# Patient Record
Sex: FEMALE | Race: WHITE | Employment: FULL TIME | URBAN - METROPOLITAN AREA
[De-identification: names, ages, dates, MRNs, and addresses within clinical notes are randomized per-mention and may not be internally consistent; named-entity substitution may affect disease eponyms.]

---

## 2017-09-09 PROCEDURE — 86706 HEP B SURFACE ANTIBODY: CPT | Performed by: INTERNAL MEDICINE

## 2017-09-09 PROCEDURE — 87340 HEPATITIS B SURFACE AG IA: CPT | Performed by: INTERNAL MEDICINE

## 2017-09-09 PROCEDURE — 82607 VITAMIN B-12: CPT | Performed by: INTERNAL MEDICINE

## 2017-09-15 ENCOUNTER — HOSPITAL ENCOUNTER (OUTPATIENT)
Age: 52
Discharge: HOME | End: 2017-09-15
Payer: COMMERCIAL

## 2017-09-15 ENCOUNTER — APPOINTMENT (OUTPATIENT)
Dept: GENERAL RADIOLOGY | Age: 52
End: 2017-09-15
Attending: PHYSICIAN ASSISTANT
Payer: COMMERCIAL

## 2017-09-15 VITALS
BODY MASS INDEX: 33.46 KG/M2 | WEIGHT: 196 LBS | HEART RATE: 76 BPM | TEMPERATURE: 99 F | OXYGEN SATURATION: 96 % | HEIGHT: 64 IN | RESPIRATION RATE: 18 BRPM | SYSTOLIC BLOOD PRESSURE: 146 MMHG | DIASTOLIC BLOOD PRESSURE: 80 MMHG

## 2017-09-15 DIAGNOSIS — M25.532 LEFT WRIST PAIN: Primary | ICD-10-CM

## 2017-09-15 DIAGNOSIS — M79.642 LEFT HAND PAIN: ICD-10-CM

## 2017-09-15 PROCEDURE — 73130 X-RAY EXAM OF HAND: CPT | Performed by: PHYSICIAN ASSISTANT

## 2017-09-15 PROCEDURE — 73110 X-RAY EXAM OF WRIST: CPT | Performed by: PHYSICIAN ASSISTANT

## 2017-09-15 PROCEDURE — 99213 OFFICE O/P EST LOW 20 MIN: CPT

## 2017-09-15 NOTE — ED PROVIDER NOTES
Patient Seen in: 5 [facility] [place]    History   Patient presents with:  Wrist Pain    Stated Complaint: Left Wrist Injury    HPI    Patient is a 27-year-old female who presents for evaluation of left wrist and hand pain ×1 day. Other systems are as noted in HPI. Constitutional and vital signs reviewed. All other systems reviewed and negative except as noted above. PSFH elements reviewed from today and agreed except as otherwise stated in HPI.     Physical Exam   ED Triage Clinical Impression:  Left wrist pain  (primary encounter diagnosis)  Left hand pain    Disposition:  Discharge    Follow-up:  Orestes Lee MD  [address]  [phone]    Schedule an appointment as soon as possible

## 2017-09-15 NOTE — ED NOTES
PATIENT REQUESTING TO SEE THE VELCRO SPLINT ON.  PATIENT STATES \"I JUST WANT TO SEE HOW IT FITS ON AND THEN I'LL BUY ONE AT [facility]"

## 2017-09-15 NOTE — ED INITIAL ASSESSMENT (HPI)
PATIENT ARRIVED AMBULATORY TO ROOM C/O LEFT WRIST PAIN STARTED LAST NIGHT. PATIENT STATES \"I WAS USING THE TV REMOTE YESTERDAY NIGHT AND IT WAS HARD TO WORK IT AND MY WRIST WAS REALLY HURTING\" NO ACUTE INJURY.  PATIENT STATES \"IT'S HARD FOR ME TO  TH

## 2017-09-27 PROCEDURE — 84075 ASSAY ALKALINE PHOSPHATASE: CPT | Performed by: INTERNAL MEDICINE

## 2017-09-27 PROCEDURE — 84080 ASSAY ALKALINE PHOSPHATASES: CPT | Performed by: INTERNAL MEDICINE

## 2017-12-21 PROCEDURE — 86663 EPSTEIN-BARR ANTIBODY: CPT | Performed by: INTERNAL MEDICINE

## 2017-12-21 PROCEDURE — 86665 EPSTEIN-BARR CAPSID VCA: CPT | Performed by: INTERNAL MEDICINE

## 2017-12-21 PROCEDURE — 86664 EPSTEIN-BARR NUCLEAR ANTIGEN: CPT | Performed by: INTERNAL MEDICINE

## 2017-12-21 PROCEDURE — 86618 LYME DISEASE ANTIBODY: CPT | Performed by: INTERNAL MEDICINE

## 2018-04-25 PROCEDURE — 82607 VITAMIN B-12: CPT | Performed by: INTERNAL MEDICINE

## 2018-08-21 ENCOUNTER — CHARTING TRANS (OUTPATIENT)
Dept: OTHER | Age: 53
End: 2018-08-21

## 2018-08-21 ENCOUNTER — HOSPITAL (OUTPATIENT)
Dept: OTHER | Age: 53
End: 2018-08-21
Attending: INTERNAL MEDICINE

## 2018-08-21 LAB
ANALYZER ANC (IANC): NORMAL
ANION GAP SERPL CALC-SCNC: 11 MMOL/L (ref 10–20)
BUN SERPL-MCNC: 14 MG/DL (ref 6–20)
BUN/CREAT SERPL: 22 (ref 7–25)
CALCIUM SERPL-MCNC: 9.1 MG/DL (ref 8.4–10.2)
CHLORIDE: 105 MMOL/L (ref 98–107)
CO2 SERPL-SCNC: 26 MMOL/L (ref 21–32)
CREAT SERPL-MCNC: 0.64 MG/DL (ref 0.51–0.95)
ERYTHROCYTE [DISTWIDTH] IN BLOOD: 13.4 % (ref 11–15)
GLUCOSE SERPL-MCNC: 116 MG/DL (ref 65–99)
HEMATOCRIT: 43.9 % (ref 36–46.5)
HGB BLD-MCNC: 14.9 GM/DL (ref 12–15.5)
MCH RBC QN AUTO: 30 PG (ref 26–34)
MCHC RBC AUTO-ENTMCNC: 33.9 GM/DL (ref 32–36.5)
MCV RBC AUTO: 88.3 FL (ref 78–100)
NRBC (NRBCRE): NORMAL
PLATELET # BLD: 251 THOUSAND/MCL (ref 140–450)
POTASSIUM SERPL-SCNC: 4.2 MMOL/L (ref 3.4–5.1)
RBC # BLD: 4.97 MILLION/MCL (ref 4–5.2)
SODIUM SERPL-SCNC: 138 MMOL/L (ref 135–145)
WBC # BLD: 9.2 THOUSAND/MCL (ref 4.2–11)

## 2019-06-01 PROCEDURE — 88305 TISSUE EXAM BY PATHOLOGIST: CPT | Performed by: INTERNAL MEDICINE

## 2019-07-18 PROCEDURE — 88175 CYTOPATH C/V AUTO FLUID REDO: CPT | Performed by: INTERNAL MEDICINE

## 2019-10-22 DIAGNOSIS — M25.571 PAIN IN RIGHT ANKLE AND JOINTS OF RIGHT FOOT: Primary | ICD-10-CM

## 2019-10-22 DIAGNOSIS — R26.2 DIFFICULTY WALKING INVOLVING FOOT: ICD-10-CM

## 2019-10-22 DIAGNOSIS — R60.0 EDEMA, LOWER EXTREMITY: ICD-10-CM

## 2019-10-23 ENCOUNTER — LAB ENCOUNTER (OUTPATIENT)
Dept: LAB | Facility: HOSPITAL | Age: 54
End: 2019-10-23
Attending: PODIATRIST
Payer: COMMERCIAL

## 2019-10-23 DIAGNOSIS — R60.0 EDEMA, LOWER EXTREMITY: ICD-10-CM

## 2019-10-23 DIAGNOSIS — R26.2 DIFFICULTY WALKING INVOLVING FOOT: ICD-10-CM

## 2019-10-23 DIAGNOSIS — M25.571 PAIN IN RIGHT ANKLE AND JOINTS OF RIGHT FOOT: ICD-10-CM

## 2019-10-23 PROCEDURE — 80048 BASIC METABOLIC PNL TOTAL CA: CPT

## 2019-10-23 PROCEDURE — 86431 RHEUMATOID FACTOR QUANT: CPT

## 2019-10-23 PROCEDURE — 85025 COMPLETE CBC W/AUTO DIFF WBC: CPT

## 2019-10-23 PROCEDURE — 36415 COLL VENOUS BLD VENIPUNCTURE: CPT

## 2019-10-23 PROCEDURE — 86038 ANTINUCLEAR ANTIBODIES: CPT

## 2019-11-18 ENCOUNTER — OFFICE VISIT (OUTPATIENT)
Dept: RHEUMATOLOGY | Facility: CLINIC | Age: 54
End: 2019-11-18
Payer: COMMERCIAL

## 2019-11-18 ENCOUNTER — APPOINTMENT (OUTPATIENT)
Dept: LAB | Age: 54
End: 2019-11-18
Attending: INTERNAL MEDICINE
Payer: COMMERCIAL

## 2019-11-18 VITALS
BODY MASS INDEX: 38.41 KG/M2 | WEIGHT: 225 LBS | SYSTOLIC BLOOD PRESSURE: 132 MMHG | HEART RATE: 69 BPM | HEIGHT: 64 IN | DIASTOLIC BLOOD PRESSURE: 78 MMHG

## 2019-11-18 DIAGNOSIS — M05.80 POLYARTHRITIS WITH POSITIVE RHEUMATOID FACTOR (HCC): ICD-10-CM

## 2019-11-18 DIAGNOSIS — M25.50 POLYARTHRALGIA: Primary | ICD-10-CM

## 2019-11-18 DIAGNOSIS — M25.50 POLYARTHRALGIA: ICD-10-CM

## 2019-11-18 PROCEDURE — 86235 NUCLEAR ANTIGEN ANTIBODY: CPT

## 2019-11-18 PROCEDURE — 82955 ASSAY OF G6PD ENZYME: CPT | Performed by: INTERNAL MEDICINE

## 2019-11-18 PROCEDURE — 86200 CCP ANTIBODY: CPT | Performed by: INTERNAL MEDICINE

## 2019-11-18 PROCEDURE — 86160 COMPLEMENT ANTIGEN: CPT | Performed by: INTERNAL MEDICINE

## 2019-11-18 PROCEDURE — 86140 C-REACTIVE PROTEIN: CPT | Performed by: INTERNAL MEDICINE

## 2019-11-18 PROCEDURE — 87340 HEPATITIS B SURFACE AG IA: CPT | Performed by: INTERNAL MEDICINE

## 2019-11-18 PROCEDURE — 86706 HEP B SURFACE ANTIBODY: CPT | Performed by: INTERNAL MEDICINE

## 2019-11-18 PROCEDURE — 86480 TB TEST CELL IMMUN MEASURE: CPT

## 2019-11-18 PROCEDURE — 36415 COLL VENOUS BLD VENIPUNCTURE: CPT | Performed by: INTERNAL MEDICINE

## 2019-11-18 PROCEDURE — 86225 DNA ANTIBODY NATIVE: CPT

## 2019-11-18 PROCEDURE — 86803 HEPATITIS C AB TEST: CPT | Performed by: INTERNAL MEDICINE

## 2019-11-18 PROCEDURE — 99244 OFF/OP CNSLTJ NEW/EST MOD 40: CPT | Performed by: INTERNAL MEDICINE

## 2019-11-18 PROCEDURE — 85652 RBC SED RATE AUTOMATED: CPT | Performed by: INTERNAL MEDICINE

## 2019-11-18 PROCEDURE — 86704 HEP B CORE ANTIBODY TOTAL: CPT | Performed by: INTERNAL MEDICINE

## 2019-11-18 NOTE — PROGRESS NOTES
Joe Schulz is a 49year old female who presents for Patient presents with:  Consult: High CLAUDIA  Joint Pain  Swelling: BL ankles  Fatigue  .    HPI:   CC: joint pain and swelling  Consult: referred by PCP Dr. Ellen Echavarria    This is a  50 yo F with hx of H Multiple Vitamins-Minerals (BIOTIN PLUS/CALCIUM/VIT D3) Oral Tab Take by mouth. • Vitamin B-12 1000 MCG Oral Tab Take 1,000 mcg by mouth daily.      • Cholestyramine 4 GM/DOSE Oral Powder      • Rosuvastatin Calcium (CRESTOR) 20 MG Oral Tab Take 1 table mouth, no Raynaud's, no nasal ulcers, no parotid swelling, no neck pain, no jaw pain, no temple pain  Eyes: No visual changes,   CVS: No chest pain, no heart disease  RS: No SOB, no Cough, No Pleurtic pain,   GI: No nausea, no vomiiting, no abominal pain, 13.5    Hematocrit      35.0 - 48.0 % 40.3    MCV      80.0 - 100.0 fL 86.3    MCH      26.0 - 34.0 pg 28.9    MCHC      31.0 - 37.0 g/dL 33.5    RDW-SD      35.1 - 46.3 fL 41.0    RDW      11.0 - 15.0 % 13.2    Platelet Count      438.3 - 450.0 10(3)uL 29 taken today.  No fractures, dislocation, or degenerative changes.      ASSESSMENT AND PLAN:     This is a  50 yo F with hx of HLD, CAD s/p Bypass, Basal cell carcinoma (last one 2018), Depression presents with joint pain and swelling and +RF.   Pain and swe

## 2019-11-18 NOTE — PATIENT INSTRUCTIONS
You were seen for joint pain and +RF  Now lets get some more blood work  Will have you f/u in 2 weeks

## 2019-11-21 ENCOUNTER — PATIENT MESSAGE (OUTPATIENT)
Dept: RHEUMATOLOGY | Facility: CLINIC | Age: 54
End: 2019-11-21

## 2019-11-22 ENCOUNTER — HOSPITAL ENCOUNTER (OUTPATIENT)
Dept: MRI IMAGING | Facility: HOSPITAL | Age: 54
Discharge: HOME | End: 2019-11-22
Attending: ORTHOPAEDIC SURGERY
Payer: COMMERCIAL

## 2019-11-22 DIAGNOSIS — M23.91 ACUTE INTERNAL DERANGEMENT OF RIGHT KNEE: ICD-10-CM

## 2019-11-22 PROCEDURE — 73721 MRI JNT OF LWR EXTRE W/O DYE: CPT | Performed by: ORTHOPAEDIC SURGERY

## 2019-11-22 NOTE — TELEPHONE ENCOUNTER
From: Shakira Rose  To: Marbin Petit MD  Sent: 11/21/2019 3:07 PM CST  Subject: Test Results Question    I got your message but I was just curious on the C-Reactive Protein. You said it was normal but my results were . 66 and the normal is under . 35

## 2019-12-12 ENCOUNTER — OFFICE VISIT (OUTPATIENT)
Dept: RHEUMATOLOGY | Facility: CLINIC | Age: 54
End: 2019-12-12
Payer: COMMERCIAL

## 2019-12-12 VITALS
RESPIRATION RATE: 16 BRPM | SYSTOLIC BLOOD PRESSURE: 112 MMHG | HEART RATE: 75 BPM | WEIGHT: 232 LBS | OXYGEN SATURATION: 96 % | DIASTOLIC BLOOD PRESSURE: 76 MMHG | HEIGHT: 64 IN | BODY MASS INDEX: 39.61 KG/M2

## 2019-12-12 DIAGNOSIS — M25.50 POLYARTHRALGIA: ICD-10-CM

## 2019-12-12 DIAGNOSIS — M05.79 RHEUMATOID ARTHRITIS INVOLVING MULTIPLE SITES WITH POSITIVE RHEUMATOID FACTOR (HCC): Primary | ICD-10-CM

## 2019-12-12 PROCEDURE — 99214 OFFICE O/P EST MOD 30 MIN: CPT | Performed by: INTERNAL MEDICINE

## 2019-12-12 RX ORDER — METHOTREXATE 2.5 MG/1
15 TABLET ORAL WEEKLY
Qty: 48 TABLET | Refills: 0 | Status: SHIPPED | OUTPATIENT
Start: 2019-12-12 | End: 2020-02-05

## 2019-12-12 RX ORDER — MELOXICAM 7.5 MG/1
7.5 TABLET ORAL 2 TIMES DAILY
Qty: 60 TABLET | Refills: 0 | Status: SHIPPED | OUTPATIENT
Start: 2019-12-12 | End: 2020-07-28

## 2019-12-12 RX ORDER — FOLIC ACID 1 MG/1
1 TABLET ORAL DAILY
Qty: 90 TABLET | Refills: 0 | Status: SHIPPED | OUTPATIENT
Start: 2019-12-12 | End: 2020-02-05

## 2019-12-12 NOTE — PROGRESS NOTES
Keysha Villa is a 49year old female. HPI:   Patient presents with:   Follow - Up: Polyarthralgia, pt c/o stiff neck and pain of knees, ankles and back  Lab Results      I had the pleasure of seeing Keysha Villa on 12/12/2019 for follow u 75 MG Oral Capsule SR 24 Hr Once daily 90 capsule 3   • Zolpidem Tartrate 5 MG Oral Tab TAKE 1 TABLET BY MOUTH AT BEDTIME AS NEEDED FOR SLEEP 30 tablet 5   • Cholecalciferol (VITAMIN D3) 2000 units Oral Tab Take by mouth.      • Multiple Vitamins-Minerals ( x10(3) uL  7.8   RBC      3.80 - 5.30 x10(6)uL  4.67   Hemoglobin      12.0 - 16.0 g/dL  13.5   Hematocrit      35.0 - 48.0 %  40.3   MCV      80.0 - 100.0 fL  86.3   MCH      26.0 - 34.0 pg  28.9   MCHC      31.0 - 37.0 g/dL  33.5   RDW-SD      35.1 - 46. Negative    Anti-Sjogren's B      Negative Negative      Component      Latest Ref Rng & Units 11/18/2019   Quantiferon TB NIL      IU/mL 0.03   Quantiferon-TB1 Minus NIL      IU/mL -0.01   Quantiferon-TB2 Minus NIL      IU/mL 0.00   Quantiferon TB Mitogen

## 2020-02-01 ENCOUNTER — OFFICE VISIT (OUTPATIENT)
Dept: RHEUMATOLOGY | Facility: CLINIC | Age: 55
End: 2020-02-01
Payer: COMMERCIAL

## 2020-02-01 ENCOUNTER — TELEPHONE (OUTPATIENT)
Dept: RHEUMATOLOGY | Facility: CLINIC | Age: 55
End: 2020-02-01

## 2020-02-01 VITALS
SYSTOLIC BLOOD PRESSURE: 120 MMHG | HEART RATE: 76 BPM | WEIGHT: 226.5 LBS | DIASTOLIC BLOOD PRESSURE: 80 MMHG | BODY MASS INDEX: 38.67 KG/M2 | HEIGHT: 64 IN

## 2020-02-01 DIAGNOSIS — M25.50 POLYARTHRALGIA: ICD-10-CM

## 2020-02-01 DIAGNOSIS — Z51.81 MEDICATION MONITORING ENCOUNTER: ICD-10-CM

## 2020-02-01 DIAGNOSIS — M05.79 RHEUMATOID ARTHRITIS INVOLVING MULTIPLE SITES WITH POSITIVE RHEUMATOID FACTOR (HCC): Primary | ICD-10-CM

## 2020-02-01 PROCEDURE — 99214 OFFICE O/P EST MOD 30 MIN: CPT | Performed by: INTERNAL MEDICINE

## 2020-02-01 NOTE — PATIENT INSTRUCTIONS
You were seen today for RA  Cont MTX 6 pills weekly  Plan to get you approved for [employer]  F/u in 6 weeks

## 2020-02-01 NOTE — PROGRESS NOTES
Mariah Donald is a 49year old female. HPI:   Patient presents with:  Rheumatoid Arthritis: pt reports symptoms remain the same  Back Pain      I had the pleasure of seeing Mariah Donald on 2/1/2020 for follow up newly dx Seropositive RA. Outpatient Medications   Medication Sig Dispense Refill   • Methotrexate Sodium 2.5 MG Oral Tab Take 6 tablets (15 mg total) by mouth once a week. 48 tablet 0   • folic acid 1 MG Oral Tab Take 1 tablet (1 mg total) by mouth daily.  90 tablet 0   • Venlafaxi Ankles: b/l swelling, L worse than R  Feet: no pain with MTP squeeze, no toe swelling or pain or warmth on palpation with FROM  Spine: no lumbar or sacral pain on palpation. NEURO: Cranial nerves II-XII intact grossly.  5/5 strength throughout in both up [id number]   HEPATITIS B CORE AB, TOTAL      Nonreactive  Nonreactive   GLUCOSE-6-PHOSPHATE DEHYDROGEN      9.9 - 16.6 U/g Hb 10.8   HCV AB      Nonreactive  Nonreactive     Imaging:     MRI R knee 11/2019:  CONCLUSION:   1. Mild chondromalacia patella.   2.

## 2020-02-05 ENCOUNTER — TELEPHONE (OUTPATIENT)
Dept: RHEUMATOLOGY | Facility: CLINIC | Age: 55
End: 2020-02-05

## 2020-02-05 NOTE — TELEPHONE ENCOUNTER
Patient is calling because she has one pill left of Methotrexate Sodium 2.5 MG Oral Tab, she was informed by Dr. Kati Ge that she would be leaning patient off this medication and starting a new one.  Patient wants to know if she will be on Methotrexate Sodium

## 2020-02-05 NOTE — TELEPHONE ENCOUNTER
Please see below. Pt was approved for Xenia Barker through 5/3/2020. Should pt discontinue MTX at this time? Or continue MTX with Xenia Barker and discuss tapering off during f/u appt on 4/4? Please advise.

## 2020-02-05 NOTE — TELEPHONE ENCOUNTER
Pt advised to stop methotrexate per provider's message below. She is aware to contact [facility] phone number provided by [facility] and schedule delivery of medication. Pt verbalized understanding and agreeable with plan.

## 2020-02-13 NOTE — TELEPHONE ENCOUNTER
Spoke with pt, she has received her medication. Pt is aware to complete monitoring labs prior to f/u appt.      Future Appointments   Date Time Provider Norma Morley   4/4/2020  9:00 AM Jair Isabel MD 2014 [facility]

## 2020-02-19 NOTE — TELEPHONE ENCOUNTER
Active 2/1/2020 Gonzalez Horan 4/29/1965 Michael Porter XR Approved The patient must fill with [facility]. We have left a message for the patient to notify her and transferred the prescription and copay card on your behalf. None.  [facility] 0.00 DISPLAY PLAN FREE TEXT

## 2020-03-27 ENCOUNTER — PATIENT MESSAGE (OUTPATIENT)
Dept: RHEUMATOLOGY | Facility: CLINIC | Age: 55
End: 2020-03-27

## 2020-04-02 NOTE — TELEPHONE ENCOUNTER
Pt returned call and is agreeable to rescheduling appt. She currently feels well. Denies any side effects to new medication. She completed monitoring labs on 3/27 - labs were reviewed by provider. Appt rescheduled to:      Future Appointments   Date Time Pr

## 2020-05-06 ENCOUNTER — VIRTUAL PHONE E/M (OUTPATIENT)
Dept: RHEUMATOLOGY | Facility: CLINIC | Age: 55
End: 2020-05-06
Payer: COMMERCIAL

## 2020-05-06 DIAGNOSIS — Z51.81 MEDICATION MONITORING ENCOUNTER: ICD-10-CM

## 2020-05-06 DIAGNOSIS — M05.79 RHEUMATOID ARTHRITIS INVOLVING MULTIPLE SITES WITH POSITIVE RHEUMATOID FACTOR (HCC): Primary | ICD-10-CM

## 2020-05-06 PROCEDURE — 99213 OFFICE O/P EST LOW 20 MIN: CPT | Performed by: INTERNAL MEDICINE

## 2020-05-06 NOTE — PROGRESS NOTES
Sissy Pena is a 48year old female. HPI:   No chief complaint on file. Virtual Telephone Check-In    Sissy Pena verbally consents to a Virtual/Telephone Check-In visit on 05/06/20.     Patient understands and accepts financial re her ears also resolved      HISTORY:  Past Medical History:   Diagnosis Date   • ASTHMA    • Atherosclerosis of coronary artery    • Cancer [facility])     Basal Cell Carcinoma   • HEART DISEASE 11/2006    2v CABG   • Other and unspecified hyperlipidemia       S Absolute      0.90 - 4.00 10ˆ3/µL 2.36   Monocytes Absolute      0.10 - 1.00 10ˆ3/µL 0.65   Eosinophils Absolute      0.00 - 0.30 10ˆ3/µL 0.12   Basophils Absolute      0.00 - 0.10 10ˆ3/µL 0.05   nRBC Absolute      0.000 - 0.012 10ˆ3/µL 0.000   Neutrophils

## 2020-05-06 NOTE — PATIENT INSTRUCTIONS
We discussed her symptoms of rheumatoid arthritis  Continue Xeljanz 11 mg daily  Please get blood work in July  Follow-up after blood work

## 2020-05-20 ENCOUNTER — TELEPHONE (OUTPATIENT)
Dept: RHEUMATOLOGY | Facility: CLINIC | Age: 55
End: 2020-05-20

## 2020-05-20 NOTE — TELEPHONE ENCOUNTER
Completed via Cover My Meds: This request has been approved. CaseId:[id number];Status:Approved; Review Type:Prior Auth; Coverage Start Date:04/20/2020; Coverage End Date:05/20/2023;

## 2020-07-24 ENCOUNTER — OFFICE VISIT (OUTPATIENT)
Dept: RHEUMATOLOGY | Facility: CLINIC | Age: 55
End: 2020-07-24
Payer: COMMERCIAL

## 2020-07-24 VITALS
SYSTOLIC BLOOD PRESSURE: 120 MMHG | HEART RATE: 71 BPM | HEIGHT: 64 IN | BODY MASS INDEX: 38.24 KG/M2 | DIASTOLIC BLOOD PRESSURE: 80 MMHG | WEIGHT: 224 LBS

## 2020-07-24 DIAGNOSIS — M05.79 RHEUMATOID ARTHRITIS INVOLVING MULTIPLE SITES WITH POSITIVE RHEUMATOID FACTOR (HCC): Primary | ICD-10-CM

## 2020-07-24 DIAGNOSIS — Z51.81 MEDICATION MONITORING ENCOUNTER: ICD-10-CM

## 2020-07-24 PROCEDURE — 3074F SYST BP LT 130 MM HG: CPT | Performed by: INTERNAL MEDICINE

## 2020-07-24 PROCEDURE — 3079F DIAST BP 80-89 MM HG: CPT | Performed by: INTERNAL MEDICINE

## 2020-07-24 PROCEDURE — 3008F BODY MASS INDEX DOCD: CPT | Performed by: INTERNAL MEDICINE

## 2020-07-24 PROCEDURE — 99214 OFFICE O/P EST MOD 30 MIN: CPT | Performed by: INTERNAL MEDICINE

## 2020-07-24 NOTE — PROGRESS NOTES
Rahul Fonseca is a 48year old female. HPI:   Patient presents with:  Medication Follow-Up: Shayy Don  Test Results  Knee Pain    Joyce Arana presents today 7/24/2020 for newly dx Seropositive RA.      Current Medications:  Xeljanz 11 mg daily- started artery    • Cancer [facility])     Basal Cell Carcinoma   • HEART DISEASE 11/2006    2v CABG   • Other and unspecified hyperlipidemia       Social Hx Reviewed   Family Hx Reviewed     Medications (Active prior to today's visit):  Current Outpatient Medications palpation  Feet: no pain with MTP squeeze, no toe swelling or pain or warmth on palpation with FROM  Spine: no lumbar or sacral pain on palpation. NEURO: Cranial nerves II-XII intact grossly.  5/5 strength throughout in both upper and lower extremities, se TOTAL      Nonreactive  Nonreactive   GLUCOSE-6-PHOSPHATE DEHYDROGEN      9.9 - 16.6 U/g Hb 10.8   HCV AB      Nonreactive  Nonreactive     Imaging:     MRI R knee 11/2019:  CONCLUSION:   1. Mild chondromalacia patella.   2. Otherwise no acute internal righ

## 2020-08-19 RX ORDER — TOFACITINIB 11 MG/1
11 TABLET, FILM COATED, EXTENDED RELEASE ORAL DAILY
Qty: 30 TABLET | Refills: 5 | Status: SHIPPED | OUTPATIENT
Start: 2020-08-19 | End: 2021-01-20

## 2020-08-19 NOTE — TELEPHONE ENCOUNTER
Patient is requesting a refill to [facility] [address] [place] [address]. Patient has about 5-6 pills left. They are requesting a verbal script. Patient is requesting a call as soon as medication is called in. [facility] Ph # [phone].     XELJANZ XR 11 MG Oral Tablet 24 Hr

## 2020-08-19 NOTE — TELEPHONE ENCOUNTER
LOV: 7/24/2020  Future Appointments   Date Time Provider Norma Mejíaisti   11/7/2020  8:40 AM Marciano Reese MD 2014 [facility]     Labs:   Labs scanned to media from 7/3/2020:   - CBC WNL  - Cr WNL  - ALT elevated at 52 IU/L

## 2020-10-28 ENCOUNTER — TELEPHONE (OUTPATIENT)
Dept: RHEUMATOLOGY | Facility: CLINIC | Age: 55
End: 2020-10-28

## 2020-10-28 NOTE — TELEPHONE ENCOUNTER
Name and  verified. Pt given provider's message below and verbalized understanding. She has a follow up appt on . FYI    Component      Latest Ref Rng & Units 10/26/2020   Patient Fasting?        Yes   Glucose      74 - 109 mg/dL 112 (H)   BUN

## 2020-11-07 ENCOUNTER — OFFICE VISIT (OUTPATIENT)
Dept: RHEUMATOLOGY | Facility: CLINIC | Age: 55
End: 2020-11-07
Payer: COMMERCIAL

## 2020-11-07 ENCOUNTER — LAB ENCOUNTER (OUTPATIENT)
Dept: LAB | Facility: HOSPITAL | Age: 55
End: 2020-11-07
Attending: INTERNAL MEDICINE
Payer: COMMERCIAL

## 2020-11-07 VITALS
BODY MASS INDEX: 36.7 KG/M2 | HEIGHT: 64 IN | HEART RATE: 76 BPM | WEIGHT: 215 LBS | DIASTOLIC BLOOD PRESSURE: 77 MMHG | SYSTOLIC BLOOD PRESSURE: 112 MMHG

## 2020-11-07 DIAGNOSIS — Z51.81 MEDICATION MONITORING ENCOUNTER: ICD-10-CM

## 2020-11-07 DIAGNOSIS — M05.79 RHEUMATOID ARTHRITIS INVOLVING MULTIPLE SITES WITH POSITIVE RHEUMATOID FACTOR (HCC): ICD-10-CM

## 2020-11-07 DIAGNOSIS — M05.79 RHEUMATOID ARTHRITIS INVOLVING MULTIPLE SITES WITH POSITIVE RHEUMATOID FACTOR (HCC): Primary | ICD-10-CM

## 2020-11-07 PROCEDURE — 3074F SYST BP LT 130 MM HG: CPT | Performed by: INTERNAL MEDICINE

## 2020-11-07 PROCEDURE — 3078F DIAST BP <80 MM HG: CPT | Performed by: INTERNAL MEDICINE

## 2020-11-07 PROCEDURE — 36415 COLL VENOUS BLD VENIPUNCTURE: CPT

## 2020-11-07 PROCEDURE — 3008F BODY MASS INDEX DOCD: CPT | Performed by: INTERNAL MEDICINE

## 2020-11-07 PROCEDURE — 99072 ADDL SUPL MATRL&STAF TM PHE: CPT | Performed by: INTERNAL MEDICINE

## 2020-11-07 PROCEDURE — 99214 OFFICE O/P EST MOD 30 MIN: CPT | Performed by: INTERNAL MEDICINE

## 2020-11-07 PROCEDURE — 85025 COMPLETE CBC W/AUTO DIFF WBC: CPT

## 2020-11-07 NOTE — PATIENT INSTRUCTIONS
You were seen today for rheumatoid arthritis  Please get one blood work done today it is the [address] daily  Please get blood work in 3 months again  Follow-up in 3 months

## 2020-11-07 NOTE — PROGRESS NOTES
Kosta Herrera is a 48year old female. HPI:   Patient presents with: Follow - Up    Nikole Yo presents today 11/7/2020 for newly dx Seropositive RA.      Current Medications:  Xeljanz 11 mg daily- started Feb 2020  Previous medications:  MTX 6 pill Medications (Active prior to today's visit):  Current Outpatient Medications   Medication Sig Dispense Refill   • Venlafaxine HCl ER (EFFEXOR XR) 150 MG Oral Capsule SR 24 Hr Take 1 capsule (150 mg total) by mouth daily.  90 capsule 3   • zolpidem 5 MG no lateral hip pain, MARCO A test negative b/l  Knees: FROM, no warmth or effusion present. No pain with ROM.    Ankles: FROM, no pain or swelling or warmth on palpation  Feet: no pain with MTP squeeze, no toe swelling or pain or warmth on palpation with FROM

## 2021-01-20 ENCOUNTER — PATIENT MESSAGE (OUTPATIENT)
Dept: RHEUMATOLOGY | Facility: CLINIC | Age: 56
End: 2021-01-20

## 2021-01-20 RX ORDER — TOFACITINIB 11 MG/1
11 TABLET, FILM COATED, EXTENDED RELEASE ORAL DAILY
Qty: 30 TABLET | Refills: 5 | Status: SHIPPED | OUTPATIENT
Start: 2021-01-20 | End: 2021-02-22

## 2021-01-20 NOTE — TELEPHONE ENCOUNTER
LOV: 11/7/2020  Future Appointments   Date Time Provider Norma Morley   3/6/2021  8:40 AM Jair Isabel MD 2014 [facility]   8/10/2021  5:00 PM Michi Camara PA-C [facility]   8/19/2021  4:40 PM Miroslava Cr MD DG [facility]     Labs: % 0.2

## 2021-02-22 RX ORDER — TOFACITINIB 11 MG/1
11 TABLET, FILM COATED, EXTENDED RELEASE ORAL DAILY
Qty: 30 TABLET | Refills: 5 | Status: SHIPPED | OUTPATIENT
Start: 2021-02-22 | End: 2021-10-19

## 2021-02-22 NOTE — TELEPHONE ENCOUNTER
Requested Prescriptions     Pending Prescriptions Disp Refills   • XELJANZ XR 11 MG Oral Tablet 24 Hr 30 tablet 5     Sig: Take 11 mg by mouth daily.      LF: 1/20/21 #30 tab w/ 5 rf  LOV: 11/7/20   Future Appointments   Date Time Provider Norma Morley 0.00 - 1.20 mg/dL 0.32   ALKALINE PHOSPHATASE      41 - 108 U/L 151 (H)   AST (SGOT)      0 - 32 U/L 52 (H)   ALT (SGPT)      0 - 33 U/L 82 (H)   GFR CKD-EPI      >=60.00 mL/min/1.73 m² 96.87     Summary:     You were seen today for rheumatoid arthritis  P

## 2021-03-06 ENCOUNTER — OFFICE VISIT (OUTPATIENT)
Dept: RHEUMATOLOGY | Facility: CLINIC | Age: 56
End: 2021-03-06
Payer: COMMERCIAL

## 2021-03-06 VITALS
WEIGHT: 207.13 LBS | DIASTOLIC BLOOD PRESSURE: 84 MMHG | SYSTOLIC BLOOD PRESSURE: 121 MMHG | HEART RATE: 79 BPM | HEIGHT: 64 IN | BODY MASS INDEX: 35.36 KG/M2

## 2021-03-06 DIAGNOSIS — M05.79 RHEUMATOID ARTHRITIS INVOLVING MULTIPLE SITES WITH POSITIVE RHEUMATOID FACTOR (HCC): Primary | ICD-10-CM

## 2021-03-06 DIAGNOSIS — Z51.81 MEDICATION MONITORING ENCOUNTER: ICD-10-CM

## 2021-03-06 PROCEDURE — 3079F DIAST BP 80-89 MM HG: CPT | Performed by: INTERNAL MEDICINE

## 2021-03-06 PROCEDURE — 3074F SYST BP LT 130 MM HG: CPT | Performed by: INTERNAL MEDICINE

## 2021-03-06 PROCEDURE — 99214 OFFICE O/P EST MOD 30 MIN: CPT | Performed by: INTERNAL MEDICINE

## 2021-03-06 PROCEDURE — 3008F BODY MASS INDEX DOCD: CPT | Performed by: INTERNAL MEDICINE

## 2021-03-06 NOTE — PROGRESS NOTES
Anibal Parker is a 48year old female. HPI:   Patient presents with:  Rheumatoid Arthritis: rt knee pain with activity     Felix Serrato presents today 3/6/2021 for newly dx Seropositive RA.      Current Medications:  Xeljanz 11 mg daily- started Feb 20 Social Hx Reviewed   Family Hx Reviewed     Medications (Active prior to today's visit):  Current Outpatient Medications   Medication Sig Dispense Refill   • XELJANZ XR 11 MG Oral Tablet 24 Hr Take 11 mg by mouth daily.  30 tablet 5   • Venlafaxine HCl FROM, no pain or swelling or warmth on palpation  Shoulders: FROM, no pain or swelling or warmth on palpation  Hip: normal log roll, no lateral hip pain, MARCO A test negative b/l  Knees: FROM, no warmth or effusion present. No pain with ROM.    Ankles: FROM, work in 3-4 mos     Mildly elevated LFTs  - US liver normal  - ?  From Edwin Tam, will continue to monitor    R knee pain 2/2 OA  - X-ray in 2019 does show evidence of mild OA    Follow up in 3-4 mos    Moshe Chapman MD  3/6/2021  8:40 AM

## 2021-03-06 NOTE — PATIENT INSTRUCTIONS
You were seen today for rheumatoid arthritis  Continue Nissa Button  We will continue to monitor your liver function  Blood work in June  Can follow-up in 4 to 5 months

## 2021-03-18 ENCOUNTER — TELEPHONE (OUTPATIENT)
Dept: RHEUMATOLOGY | Facility: CLINIC | Age: 56
End: 2021-03-18

## 2021-03-18 NOTE — TELEPHONE ENCOUNTER
Patient contacted and informed she should hold her Shayy Don for 1 week after each dose of the COVID-19 vaccine. She got the vaccine on the 11th and was not aware of this so continued to take her [place] with no side effects.  I advised the patient to hold he

## 2021-03-18 NOTE — TELEPHONE ENCOUNTER
Pt got the covid vaccine and she wants to know if she should stop her medications. Please call her back. thank you.

## 2021-06-04 ENCOUNTER — PATIENT MESSAGE (OUTPATIENT)
Dept: RHEUMATOLOGY | Facility: CLINIC | Age: 56
End: 2021-06-04

## 2021-06-04 NOTE — TELEPHONE ENCOUNTER
From: Anibal Parker  To: Moshe Chapman MD  Sent: 6/4/2021 10:21 AM CDT  Subject: Visit Follow-up Question    Good Morning! I have an appointment scheduled with you for August 7th (last seen by you on March 6th).  You wanted me to schedule a follow up

## 2021-06-04 NOTE — TELEPHONE ENCOUNTER
LOV 3/6/21  \"You were seen today for rheumatoid arthritis  Continue Frank Barbosa  We will continue to monitor your liver function  Blood work in June  Can follow-up in 4 to 5 months\"    Based on notes, patient informed to complete blood work in June.

## 2021-08-02 ENCOUNTER — LAB ENCOUNTER (OUTPATIENT)
Dept: LAB | Facility: HOSPITAL | Age: 56
End: 2021-08-02
Attending: INTERNAL MEDICINE
Payer: COMMERCIAL

## 2021-08-02 DIAGNOSIS — Z51.81 MEDICATION MONITORING ENCOUNTER: ICD-10-CM

## 2021-08-02 DIAGNOSIS — M05.79 RHEUMATOID ARTHRITIS INVOLVING MULTIPLE SITES WITH POSITIVE RHEUMATOID FACTOR (HCC): ICD-10-CM

## 2021-08-02 LAB
ALBUMIN SERPL-MCNC: 3.7 G/DL (ref 3.4–5)
ALT SERPL-CCNC: 92 U/L
AST SERPL-CCNC: 35 U/L (ref 15–37)
BASOPHILS # BLD AUTO: 0.04 X10(3) UL (ref 0–0.2)
BASOPHILS NFR BLD AUTO: 0.6 %
CREAT BLD-MCNC: 0.72 MG/DL
CRP SERPL-MCNC: 0.32 MG/DL (ref ?–0.3)
DEPRECATED RDW RBC AUTO: 43.2 FL (ref 35.1–46.3)
EOSINOPHIL # BLD AUTO: 0.14 X10(3) UL (ref 0–0.7)
EOSINOPHIL NFR BLD AUTO: 2.1 %
ERYTHROCYTE [DISTWIDTH] IN BLOOD BY AUTOMATED COUNT: 13.2 % (ref 11–15)
ERYTHROCYTE [SEDIMENTATION RATE] IN BLOOD: 13 MM/HR
HCT VFR BLD AUTO: 41.6 %
HGB BLD-MCNC: 13.7 G/DL
IMM GRANULOCYTES # BLD AUTO: 0.02 X10(3) UL (ref 0–1)
IMM GRANULOCYTES NFR BLD: 0.3 %
LYMPHOCYTES # BLD AUTO: 1.41 X10(3) UL (ref 1–4)
LYMPHOCYTES NFR BLD AUTO: 21.4 %
MCH RBC QN AUTO: 29.2 PG (ref 26–34)
MCHC RBC AUTO-ENTMCNC: 32.9 G/DL (ref 31–37)
MCV RBC AUTO: 88.7 FL
MONOCYTES # BLD AUTO: 0.45 X10(3) UL (ref 0.1–1)
MONOCYTES NFR BLD AUTO: 6.8 %
NEUTROPHILS # BLD AUTO: 4.54 X10 (3) UL (ref 1.5–7.7)
NEUTROPHILS # BLD AUTO: 4.54 X10(3) UL (ref 1.5–7.7)
NEUTROPHILS NFR BLD AUTO: 68.8 %
PLATELET # BLD AUTO: 217 10(3)UL (ref 150–450)
RBC # BLD AUTO: 4.69 X10(6)UL
WBC # BLD AUTO: 6.6 X10(3) UL (ref 4–11)

## 2021-08-02 PROCEDURE — 82040 ASSAY OF SERUM ALBUMIN: CPT

## 2021-08-02 PROCEDURE — 82565 ASSAY OF CREATININE: CPT

## 2021-08-02 PROCEDURE — 85025 COMPLETE CBC W/AUTO DIFF WBC: CPT

## 2021-08-02 PROCEDURE — 86140 C-REACTIVE PROTEIN: CPT

## 2021-08-02 PROCEDURE — 84460 ALANINE AMINO (ALT) (SGPT): CPT

## 2021-08-02 PROCEDURE — 84450 TRANSFERASE (AST) (SGOT): CPT

## 2021-08-02 PROCEDURE — 85652 RBC SED RATE AUTOMATED: CPT

## 2021-08-02 PROCEDURE — 36415 COLL VENOUS BLD VENIPUNCTURE: CPT

## 2021-08-07 ENCOUNTER — HOSPITAL ENCOUNTER (OUTPATIENT)
Dept: GENERAL RADIOLOGY | Facility: HOSPITAL | Age: 56
Discharge: HOME | End: 2021-08-07
Attending: INTERNAL MEDICINE
Payer: COMMERCIAL

## 2021-08-07 ENCOUNTER — OFFICE VISIT (OUTPATIENT)
Dept: RHEUMATOLOGY | Facility: CLINIC | Age: 56
End: 2021-08-07
Payer: COMMERCIAL

## 2021-08-07 VITALS
DIASTOLIC BLOOD PRESSURE: 79 MMHG | BODY MASS INDEX: 34.83 KG/M2 | WEIGHT: 204 LBS | HEART RATE: 64 BPM | RESPIRATION RATE: 16 BRPM | SYSTOLIC BLOOD PRESSURE: 116 MMHG | HEIGHT: 64 IN

## 2021-08-07 DIAGNOSIS — M17.11 PRIMARY OSTEOARTHRITIS OF RIGHT KNEE: ICD-10-CM

## 2021-08-07 DIAGNOSIS — Z51.81 MEDICATION MONITORING ENCOUNTER: ICD-10-CM

## 2021-08-07 DIAGNOSIS — M05.79 RHEUMATOID ARTHRITIS INVOLVING MULTIPLE SITES WITH POSITIVE RHEUMATOID FACTOR (HCC): Primary | ICD-10-CM

## 2021-08-07 PROCEDURE — 3078F DIAST BP <80 MM HG: CPT | Performed by: INTERNAL MEDICINE

## 2021-08-07 PROCEDURE — 99214 OFFICE O/P EST MOD 30 MIN: CPT | Performed by: INTERNAL MEDICINE

## 2021-08-07 PROCEDURE — 3008F BODY MASS INDEX DOCD: CPT | Performed by: INTERNAL MEDICINE

## 2021-08-07 PROCEDURE — 73560 X-RAY EXAM OF KNEE 1 OR 2: CPT | Performed by: INTERNAL MEDICINE

## 2021-08-07 PROCEDURE — 3074F SYST BP LT 130 MM HG: CPT | Performed by: INTERNAL MEDICINE

## 2021-08-07 NOTE — PATIENT INSTRUCTIONS
You were seen today for RA  Continue Yamileth Montgomery  Blood work in November and Feb  XR of the R knee  Try CBD with [facility] for your knee pain

## 2021-08-07 NOTE — PROGRESS NOTES
Ursula Garcia is a 55year old female. HPI:   Patient presents with:  Rheumatoid Arthritis  Lab Results  Medication Follow-Up    Zelalem Pires presents today 8/7/2021 for Seropositive RA.      Current Medications:  Xeljanz 11 mg daily- started Feb 2020 Reviewed   Family Hx Reviewed     Medications (Active prior to today's visit):  Current Outpatient Medications   Medication Sig Dispense Refill   • zolpidem 5 MG Oral Tab TAKE ONE TABLET BY MOUTH AT BEDTIME AS NEEDED FOR SLEEP .  30 tablet 2   • XELJANZ XR warmth on palpation with FROM  Spine: no lumbar or sacral pain on palpation. NEURO: Cranial nerves II-XII intact grossly. 5/5 strength throughout in both upper and lower extremities, sensation intact.   PSYCH: normal mood    LABS:     Component      Latest

## 2021-10-19 RX ORDER — TOFACITINIB 11 MG/1
11 TABLET, FILM COATED, EXTENDED RELEASE ORAL DAILY
Qty: 30 TABLET | Refills: 5 | Status: SHIPPED | OUTPATIENT
Start: 2021-10-19

## 2021-10-19 NOTE — TELEPHONE ENCOUNTER
Refill request for xeljanz tabs. Refill pended.     LOV: 08/07/2021  Future Appointments   Date Time Provider Norma Morley   3/5/2022  8:40 AM Shailesh Duran MD 2014 [facility]   8/16/2022  5:00 PM [facility]   8/18/2022 knee pain 2/2 OA  - X-ray in 2019 does show evidence of mild OA  - will repeat XR  - Recommend to take Tylenol arthritis 650 mg once or twice a day.   Also recommended CBD cream.  She is hesitant to do any injections     Follow up in 4-5 mos

## 2021-12-03 ENCOUNTER — LAB ENCOUNTER (OUTPATIENT)
Dept: LAB | Facility: HOSPITAL | Age: 56
End: 2021-12-03
Attending: INTERNAL MEDICINE
Payer: COMMERCIAL

## 2021-12-03 DIAGNOSIS — M17.11 PRIMARY OSTEOARTHRITIS OF RIGHT KNEE: ICD-10-CM

## 2021-12-03 DIAGNOSIS — Z00.00 ROUTINE MEDICAL EXAM: ICD-10-CM

## 2021-12-03 DIAGNOSIS — Z51.81 MEDICATION MONITORING ENCOUNTER: ICD-10-CM

## 2021-12-03 DIAGNOSIS — M05.79 RHEUMATOID ARTHRITIS INVOLVING MULTIPLE SITES WITH POSITIVE RHEUMATOID FACTOR (HCC): ICD-10-CM

## 2021-12-03 PROCEDURE — 86140 C-REACTIVE PROTEIN: CPT

## 2021-12-03 PROCEDURE — 85025 COMPLETE CBC W/AUTO DIFF WBC: CPT

## 2021-12-03 PROCEDURE — 84460 ALANINE AMINO (ALT) (SGPT): CPT

## 2021-12-03 PROCEDURE — 82040 ASSAY OF SERUM ALBUMIN: CPT

## 2021-12-03 PROCEDURE — 84450 TRANSFERASE (AST) (SGOT): CPT

## 2021-12-03 PROCEDURE — 36415 COLL VENOUS BLD VENIPUNCTURE: CPT

## 2021-12-03 PROCEDURE — 80048 BASIC METABOLIC PNL TOTAL CA: CPT

## 2021-12-03 PROCEDURE — 85652 RBC SED RATE AUTOMATED: CPT

## 2022-03-02 ENCOUNTER — PATIENT MESSAGE (OUTPATIENT)
Dept: RHEUMATOLOGY | Facility: CLINIC | Age: 57
End: 2022-03-02

## 2022-03-02 ENCOUNTER — LAB ENCOUNTER (OUTPATIENT)
Dept: LAB | Facility: HOSPITAL | Age: 57
End: 2022-03-02
Attending: INTERNAL MEDICINE
Payer: COMMERCIAL

## 2022-03-02 DIAGNOSIS — M05.79 RHEUMATOID ARTHRITIS INVOLVING MULTIPLE SITES WITH POSITIVE RHEUMATOID FACTOR (HCC): ICD-10-CM

## 2022-03-02 DIAGNOSIS — Z51.81 MEDICATION MONITORING ENCOUNTER: ICD-10-CM

## 2022-03-02 DIAGNOSIS — M17.11 PRIMARY OSTEOARTHRITIS OF RIGHT KNEE: ICD-10-CM

## 2022-03-02 LAB
ALBUMIN SERPL-MCNC: 3.8 G/DL (ref 3.4–5)
ALT SERPL-CCNC: 87 U/L
AST SERPL-CCNC: 42 U/L (ref 15–37)
BASOPHILS # BLD AUTO: 0.02 X10(3) UL (ref 0–0.2)
BASOPHILS NFR BLD AUTO: 0.5 %
CREAT BLD-MCNC: 0.78 MG/DL
CRP SERPL-MCNC: 0.72 MG/DL (ref ?–0.3)
DEPRECATED RDW RBC AUTO: 46.9 FL (ref 35.1–46.3)
EOSINOPHIL # BLD AUTO: 0.15 X10(3) UL (ref 0–0.7)
EOSINOPHIL NFR BLD AUTO: 3.4 %
ERYTHROCYTE [SEDIMENTATION RATE] IN BLOOD: 15 MM/HR
HCT VFR BLD AUTO: 43.4 %
HGB BLD-MCNC: 13.9 G/DL
IMM GRANULOCYTES # BLD AUTO: 0.01 X10(3) UL (ref 0–1)
IMM GRANULOCYTES NFR BLD: 0.2 %
LYMPHOCYTES # BLD AUTO: 1.3 X10(3) UL (ref 1–4)
LYMPHOCYTES NFR BLD AUTO: 29.3 %
MCH RBC QN AUTO: 29.4 PG (ref 26–34)
MCHC RBC AUTO-ENTMCNC: 32 G/DL (ref 31–37)
MCV RBC AUTO: 91.8 FL
MONOCYTES # BLD AUTO: 0.62 X10(3) UL (ref 0.1–1)
MONOCYTES NFR BLD AUTO: 14 %
NEUTROPHILS # BLD AUTO: 2.33 X10 (3) UL (ref 1.5–7.7)
NEUTROPHILS # BLD AUTO: 2.33 X10(3) UL (ref 1.5–7.7)
NEUTROPHILS NFR BLD AUTO: 52.6 %
PLATELET # BLD AUTO: 183 10(3)UL (ref 150–450)
RBC # BLD AUTO: 4.73 X10(6)UL
WBC # BLD AUTO: 4.4 X10(3) UL (ref 4–11)

## 2022-03-02 PROCEDURE — 82565 ASSAY OF CREATININE: CPT

## 2022-03-02 PROCEDURE — 36415 COLL VENOUS BLD VENIPUNCTURE: CPT

## 2022-03-02 PROCEDURE — 84460 ALANINE AMINO (ALT) (SGPT): CPT

## 2022-03-02 PROCEDURE — 84450 TRANSFERASE (AST) (SGOT): CPT

## 2022-03-02 PROCEDURE — 82040 ASSAY OF SERUM ALBUMIN: CPT

## 2022-03-02 PROCEDURE — 85025 COMPLETE CBC W/AUTO DIFF WBC: CPT

## 2022-03-02 PROCEDURE — 85652 RBC SED RATE AUTOMATED: CPT

## 2022-03-02 PROCEDURE — 86140 C-REACTIVE PROTEIN: CPT

## 2022-03-03 NOTE — TELEPHONE ENCOUNTER
From: Kalpesh Soler  To: Philly Diaz MD  Sent: 3/2/2022 6:51 PM CST  Subject: Test results    I saw my test results and your message regarding them. Do I need to come in for an appointment or should I an make appointment for 3-6 months.

## 2022-03-05 ENCOUNTER — OFFICE VISIT (OUTPATIENT)
Dept: RHEUMATOLOGY | Facility: CLINIC | Age: 57
End: 2022-03-05
Payer: COMMERCIAL

## 2022-03-05 VITALS — WEIGHT: 212 LBS | BODY MASS INDEX: 36.19 KG/M2 | HEIGHT: 64 IN

## 2022-03-05 DIAGNOSIS — M17.11 PRIMARY OSTEOARTHRITIS OF RIGHT KNEE: ICD-10-CM

## 2022-03-05 DIAGNOSIS — Z51.81 MEDICATION MONITORING ENCOUNTER: ICD-10-CM

## 2022-03-05 DIAGNOSIS — M05.79 RHEUMATOID ARTHRITIS INVOLVING MULTIPLE SITES WITH POSITIVE RHEUMATOID FACTOR (HCC): Primary | ICD-10-CM

## 2022-03-05 PROCEDURE — 99214 OFFICE O/P EST MOD 30 MIN: CPT | Performed by: INTERNAL MEDICINE

## 2022-03-05 PROCEDURE — 3008F BODY MASS INDEX DOCD: CPT | Performed by: INTERNAL MEDICINE

## 2022-03-05 RX ORDER — TOFACITINIB 11 MG/1
11 TABLET, FILM COATED, EXTENDED RELEASE ORAL DAILY
Qty: 30 TABLET | Refills: 5 | Status: SHIPPED | OUTPATIENT
Start: 2022-03-05

## 2022-03-05 NOTE — PATIENT INSTRUCTIONS
You were seen today for rheumatoid arthritis  Joints are controlled  Continue to get blood work every 3 months, next blood work will be in August  We discussed Naif Hou how it can increase cardiovascular risk. We could switch to Humira or Cimzia which are injections.   You can discuss with your cardiologist.  May have to have a stress test   Follow-up in the next 4 to 5 months

## 2022-07-29 ENCOUNTER — LAB ENCOUNTER (OUTPATIENT)
Dept: LAB | Facility: HOSPITAL | Age: 57
End: 2022-07-29
Attending: INTERNAL MEDICINE
Payer: COMMERCIAL

## 2022-07-29 DIAGNOSIS — M05.79 RHEUMATOID ARTHRITIS INVOLVING MULTIPLE SITES WITH POSITIVE RHEUMATOID FACTOR (HCC): ICD-10-CM

## 2022-07-29 DIAGNOSIS — M17.11 PRIMARY OSTEOARTHRITIS OF RIGHT KNEE: ICD-10-CM

## 2022-07-29 DIAGNOSIS — Z51.81 MEDICATION MONITORING ENCOUNTER: ICD-10-CM

## 2022-07-29 LAB
ALBUMIN SERPL-MCNC: 4 G/DL (ref 3.4–5)
ALT SERPL-CCNC: 57 U/L
AST SERPL-CCNC: 22 U/L (ref 15–37)
BASOPHILS # BLD AUTO: 0.05 X10(3) UL (ref 0–0.2)
BASOPHILS NFR BLD AUTO: 0.6 %
CREAT BLD-MCNC: 0.81 MG/DL
CRP SERPL-MCNC: <0.29 MG/DL (ref ?–0.3)
DEPRECATED RDW RBC AUTO: 43.1 FL (ref 35.1–46.3)
EOSINOPHIL # BLD AUTO: 0.09 X10(3) UL (ref 0–0.7)
EOSINOPHIL NFR BLD AUTO: 1 %
ERYTHROCYTE [DISTWIDTH] IN BLOOD BY AUTOMATED COUNT: 13.2 % (ref 11–15)
ERYTHROCYTE [SEDIMENTATION RATE] IN BLOOD: 14 MM/HR
HCT VFR BLD AUTO: 42.5 %
HGB BLD-MCNC: 14 G/DL
IMM GRANULOCYTES # BLD AUTO: 0.03 X10(3) UL (ref 0–1)
IMM GRANULOCYTES NFR BLD: 0.3 %
LYMPHOCYTES # BLD AUTO: 1.52 X10(3) UL (ref 1–4)
LYMPHOCYTES NFR BLD AUTO: 17.7 %
MCH RBC QN AUTO: 29.8 PG (ref 26–34)
MCHC RBC AUTO-ENTMCNC: 32.9 G/DL (ref 31–37)
MCV RBC AUTO: 90.4 FL
MONOCYTES # BLD AUTO: 0.68 X10(3) UL (ref 0.1–1)
MONOCYTES NFR BLD AUTO: 7.9 %
NEUTROPHILS # BLD AUTO: 6.21 X10 (3) UL (ref 1.5–7.7)
NEUTROPHILS # BLD AUTO: 6.21 X10(3) UL (ref 1.5–7.7)
NEUTROPHILS NFR BLD AUTO: 72.5 %
PLATELET # BLD AUTO: 179 10(3)UL (ref 150–450)
RBC # BLD AUTO: 4.7 X10(6)UL
WBC # BLD AUTO: 8.6 X10(3) UL (ref 4–11)

## 2022-07-29 PROCEDURE — 85652 RBC SED RATE AUTOMATED: CPT

## 2022-07-29 PROCEDURE — 36415 COLL VENOUS BLD VENIPUNCTURE: CPT

## 2022-07-29 PROCEDURE — 82565 ASSAY OF CREATININE: CPT

## 2022-07-29 PROCEDURE — 85025 COMPLETE CBC W/AUTO DIFF WBC: CPT

## 2022-07-29 PROCEDURE — 82040 ASSAY OF SERUM ALBUMIN: CPT

## 2022-07-29 PROCEDURE — 84450 TRANSFERASE (AST) (SGOT): CPT

## 2022-07-29 PROCEDURE — 84460 ALANINE AMINO (ALT) (SGPT): CPT

## 2022-07-29 PROCEDURE — 86140 C-REACTIVE PROTEIN: CPT

## 2022-08-06 ENCOUNTER — OFFICE VISIT (OUTPATIENT)
Dept: RHEUMATOLOGY | Facility: CLINIC | Age: 57
End: 2022-08-06
Payer: COMMERCIAL

## 2022-08-06 VITALS
BODY MASS INDEX: 37.05 KG/M2 | WEIGHT: 217 LBS | HEART RATE: 76 BPM | HEIGHT: 64 IN | SYSTOLIC BLOOD PRESSURE: 117 MMHG | DIASTOLIC BLOOD PRESSURE: 81 MMHG

## 2022-08-06 DIAGNOSIS — M05.79 RHEUMATOID ARTHRITIS INVOLVING MULTIPLE SITES WITH POSITIVE RHEUMATOID FACTOR (HCC): Primary | ICD-10-CM

## 2022-08-06 DIAGNOSIS — Z51.81 MEDICATION MONITORING ENCOUNTER: ICD-10-CM

## 2022-08-06 PROCEDURE — 3074F SYST BP LT 130 MM HG: CPT | Performed by: INTERNAL MEDICINE

## 2022-08-06 PROCEDURE — 3079F DIAST BP 80-89 MM HG: CPT | Performed by: INTERNAL MEDICINE

## 2022-08-06 PROCEDURE — 99214 OFFICE O/P EST MOD 30 MIN: CPT | Performed by: INTERNAL MEDICINE

## 2022-08-06 PROCEDURE — 3008F BODY MASS INDEX DOCD: CPT | Performed by: INTERNAL MEDICINE

## 2022-08-06 RX ORDER — TOFACITINIB 11 MG/1
11 TABLET, FILM COATED, EXTENDED RELEASE ORAL DAILY
Qty: 30 TABLET | Refills: 5 | Status: SHIPPED | OUTPATIENT
Start: 2022-08-06

## 2022-09-09 NOTE — TELEPHONE ENCOUNTER
From: Kristian Mitchell  To: Miki Betts MD  Sent: 1/20/2021 8:58 AM CST  Subject: Other    I ordered my last bottle of Xeljanz XR 11mg. I have no more refills left.  I don't have an appointment to see you for another 7 weeks, which my meds will not las Detail Level: Detailed

## 2022-11-04 ENCOUNTER — LAB ENCOUNTER (OUTPATIENT)
Dept: LAB | Facility: HOSPITAL | Age: 57
End: 2022-11-04
Attending: INTERNAL MEDICINE
Payer: COMMERCIAL

## 2022-11-04 DIAGNOSIS — Z51.81 MEDICATION MONITORING ENCOUNTER: ICD-10-CM

## 2022-11-04 DIAGNOSIS — M17.11 PRIMARY OSTEOARTHRITIS OF RIGHT KNEE: ICD-10-CM

## 2022-11-04 DIAGNOSIS — M05.79 RHEUMATOID ARTHRITIS INVOLVING MULTIPLE SITES WITH POSITIVE RHEUMATOID FACTOR (HCC): ICD-10-CM

## 2022-11-04 LAB
ALBUMIN SERPL-MCNC: 3.9 G/DL (ref 3.4–5)
ALT SERPL-CCNC: 51 U/L
AST SERPL-CCNC: 24 U/L (ref 15–37)
BASOPHILS # BLD AUTO: 0.06 X10(3) UL (ref 0–0.2)
BASOPHILS NFR BLD AUTO: 0.9 %
CREAT BLD-MCNC: 0.68 MG/DL
CRP SERPL-MCNC: <0.29 MG/DL (ref ?–0.3)
DEPRECATED RDW RBC AUTO: 43.9 FL (ref 35.1–46.3)
EOSINOPHIL # BLD AUTO: 0.16 X10(3) UL (ref 0–0.7)
EOSINOPHIL NFR BLD AUTO: 2.3 %
ERYTHROCYTE [DISTWIDTH] IN BLOOD BY AUTOMATED COUNT: 13.3 % (ref 11–15)
ERYTHROCYTE [SEDIMENTATION RATE] IN BLOOD: 28 MM/HR
GFR SERPLBLD BASED ON 1.73 SQ M-ARVRAT: 102 ML/MIN/1.73M2 (ref 60–?)
HCT VFR BLD AUTO: 43.1 %
HGB BLD-MCNC: 14 G/DL
IMM GRANULOCYTES # BLD AUTO: 0.02 X10(3) UL (ref 0–1)
IMM GRANULOCYTES NFR BLD: 0.3 %
LYMPHOCYTES # BLD AUTO: 1.63 X10(3) UL (ref 1–4)
LYMPHOCYTES NFR BLD AUTO: 23.6 %
MCH RBC QN AUTO: 29.2 PG (ref 26–34)
MCHC RBC AUTO-ENTMCNC: 32.5 G/DL (ref 31–37)
MCV RBC AUTO: 89.8 FL
MONOCYTES # BLD AUTO: 0.57 X10(3) UL (ref 0.1–1)
MONOCYTES NFR BLD AUTO: 8.2 %
NEUTROPHILS # BLD AUTO: 4.47 X10 (3) UL (ref 1.5–7.7)
NEUTROPHILS # BLD AUTO: 4.47 X10(3) UL (ref 1.5–7.7)
NEUTROPHILS NFR BLD AUTO: 64.7 %
PLATELET # BLD AUTO: 288 10(3)UL (ref 150–450)
RBC # BLD AUTO: 4.8 X10(6)UL
WBC # BLD AUTO: 6.9 X10(3) UL (ref 4–11)

## 2022-11-04 PROCEDURE — 82040 ASSAY OF SERUM ALBUMIN: CPT

## 2022-11-04 PROCEDURE — 84460 ALANINE AMINO (ALT) (SGPT): CPT

## 2022-11-04 PROCEDURE — 85025 COMPLETE CBC W/AUTO DIFF WBC: CPT

## 2022-11-04 PROCEDURE — 85652 RBC SED RATE AUTOMATED: CPT

## 2022-11-04 PROCEDURE — 82565 ASSAY OF CREATININE: CPT

## 2022-11-04 PROCEDURE — 86140 C-REACTIVE PROTEIN: CPT

## 2022-11-04 PROCEDURE — 36415 COLL VENOUS BLD VENIPUNCTURE: CPT

## 2022-11-04 PROCEDURE — 84450 TRANSFERASE (AST) (SGOT): CPT

## 2023-01-12 RX ORDER — TOFACITINIB 11 MG/1
TABLET, FILM COATED, EXTENDED RELEASE ORAL
Qty: 30 TABLET | Refills: 5 | Status: SHIPPED | OUTPATIENT
Start: 2023-01-12

## 2023-01-27 ENCOUNTER — LAB ENCOUNTER (OUTPATIENT)
Dept: LAB | Facility: HOSPITAL | Age: 58
End: 2023-01-27
Attending: INTERNAL MEDICINE
Payer: COMMERCIAL

## 2023-01-27 DIAGNOSIS — M17.11 PRIMARY OSTEOARTHRITIS OF RIGHT KNEE: ICD-10-CM

## 2023-01-27 DIAGNOSIS — Z51.81 MEDICATION MONITORING ENCOUNTER: ICD-10-CM

## 2023-01-27 DIAGNOSIS — M05.79 RHEUMATOID ARTHRITIS INVOLVING MULTIPLE SITES WITH POSITIVE RHEUMATOID FACTOR (HCC): ICD-10-CM

## 2023-01-27 LAB
ALBUMIN SERPL-MCNC: 3.8 G/DL (ref 3.4–5)
ALT SERPL-CCNC: 89 U/L
AST SERPL-CCNC: 37 U/L (ref 15–37)
BASOPHILS # BLD AUTO: 0.05 X10(3) UL (ref 0–0.2)
BASOPHILS NFR BLD AUTO: 0.5 %
CREAT BLD-MCNC: 0.82 MG/DL
CRP SERPL-MCNC: 0.43 MG/DL (ref ?–0.3)
DEPRECATED RDW RBC AUTO: 41.6 FL (ref 35.1–46.3)
EOSINOPHIL # BLD AUTO: 0.13 X10(3) UL (ref 0–0.7)
EOSINOPHIL NFR BLD AUTO: 1.3 %
ERYTHROCYTE [DISTWIDTH] IN BLOOD BY AUTOMATED COUNT: 13.2 % (ref 11–15)
ERYTHROCYTE [SEDIMENTATION RATE] IN BLOOD: 40 MM/HR
GFR SERPLBLD BASED ON 1.73 SQ M-ARVRAT: 84 ML/MIN/1.73M2 (ref 60–?)
HCT VFR BLD AUTO: 40.8 %
HGB BLD-MCNC: 13.7 G/DL
IMM GRANULOCYTES # BLD AUTO: 0.03 X10(3) UL (ref 0–1)
IMM GRANULOCYTES NFR BLD: 0.3 %
LYMPHOCYTES # BLD AUTO: 1.87 X10(3) UL (ref 1–4)
LYMPHOCYTES NFR BLD AUTO: 19.2 %
MCH RBC QN AUTO: 29.2 PG (ref 26–34)
MCHC RBC AUTO-ENTMCNC: 33.6 G/DL (ref 31–37)
MCV RBC AUTO: 87 FL
MONOCYTES # BLD AUTO: 0.72 X10(3) UL (ref 0.1–1)
MONOCYTES NFR BLD AUTO: 7.4 %
NEUTROPHILS # BLD AUTO: 6.94 X10 (3) UL (ref 1.5–7.7)
NEUTROPHILS # BLD AUTO: 6.94 X10(3) UL (ref 1.5–7.7)
NEUTROPHILS NFR BLD AUTO: 71.3 %
PLATELET # BLD AUTO: 270 10(3)UL (ref 150–450)
RBC # BLD AUTO: 4.69 X10(6)UL
WBC # BLD AUTO: 9.7 X10(3) UL (ref 4–11)

## 2023-01-27 PROCEDURE — 85652 RBC SED RATE AUTOMATED: CPT

## 2023-01-27 PROCEDURE — 84450 TRANSFERASE (AST) (SGOT): CPT

## 2023-01-27 PROCEDURE — 82040 ASSAY OF SERUM ALBUMIN: CPT

## 2023-01-27 PROCEDURE — 85025 COMPLETE CBC W/AUTO DIFF WBC: CPT

## 2023-01-27 PROCEDURE — 86140 C-REACTIVE PROTEIN: CPT

## 2023-01-27 PROCEDURE — 36415 COLL VENOUS BLD VENIPUNCTURE: CPT

## 2023-01-27 PROCEDURE — 84460 ALANINE AMINO (ALT) (SGPT): CPT

## 2023-01-27 PROCEDURE — 82565 ASSAY OF CREATININE: CPT

## 2023-02-04 ENCOUNTER — OFFICE VISIT (OUTPATIENT)
Dept: RHEUMATOLOGY | Facility: CLINIC | Age: 58
End: 2023-02-04

## 2023-02-04 VITALS
HEIGHT: 64 IN | DIASTOLIC BLOOD PRESSURE: 89 MMHG | SYSTOLIC BLOOD PRESSURE: 135 MMHG | BODY MASS INDEX: 37.39 KG/M2 | HEART RATE: 73 BPM | WEIGHT: 219 LBS

## 2023-02-04 DIAGNOSIS — M05.79 RHEUMATOID ARTHRITIS INVOLVING MULTIPLE SITES WITH POSITIVE RHEUMATOID FACTOR (HCC): Primary | ICD-10-CM

## 2023-02-04 DIAGNOSIS — Z51.81 MEDICATION MONITORING ENCOUNTER: ICD-10-CM

## 2023-02-04 PROCEDURE — 3075F SYST BP GE 130 - 139MM HG: CPT | Performed by: INTERNAL MEDICINE

## 2023-02-04 PROCEDURE — 99214 OFFICE O/P EST MOD 30 MIN: CPT | Performed by: INTERNAL MEDICINE

## 2023-02-04 PROCEDURE — 3008F BODY MASS INDEX DOCD: CPT | Performed by: INTERNAL MEDICINE

## 2023-02-04 PROCEDURE — 3079F DIAST BP 80-89 MM HG: CPT | Performed by: INTERNAL MEDICINE

## 2023-05-19 ENCOUNTER — LAB ENCOUNTER (OUTPATIENT)
Dept: LAB | Facility: HOSPITAL | Age: 58
End: 2023-05-19
Attending: INTERNAL MEDICINE
Payer: COMMERCIAL

## 2023-05-19 DIAGNOSIS — M05.79 SEROPOSITIVE RHEUMATOID ARTHRITIS OF MULTIPLE SITES (HCC): Primary | ICD-10-CM

## 2023-05-19 DIAGNOSIS — M17.11 OSTEOARTHRITIS OF RIGHT KNEE: ICD-10-CM

## 2023-05-19 DIAGNOSIS — Z51.81 ENCOUNTER FOR THERAPEUTIC DRUG MONITORING: ICD-10-CM

## 2023-05-19 LAB
ALBUMIN SERPL-MCNC: 3.7 G/DL (ref 3.4–5)
ALT SERPL-CCNC: 69 U/L
AST SERPL-CCNC: 39 U/L (ref 15–37)
BASOPHILS # BLD AUTO: 0.05 X10(3) UL (ref 0–0.2)
BASOPHILS NFR BLD AUTO: 0.6 %
CREAT BLD-MCNC: 0.69 MG/DL
CRP SERPL-MCNC: <0.29 MG/DL (ref ?–0.3)
DEPRECATED RDW RBC AUTO: 42.6 FL (ref 35.1–46.3)
EOSINOPHIL # BLD AUTO: 0.12 X10(3) UL (ref 0–0.7)
EOSINOPHIL NFR BLD AUTO: 1.5 %
ERYTHROCYTE [DISTWIDTH] IN BLOOD BY AUTOMATED COUNT: 13.1 % (ref 11–15)
ERYTHROCYTE [SEDIMENTATION RATE] IN BLOOD: 28 MM/HR
GFR SERPLBLD BASED ON 1.73 SQ M-ARVRAT: 101 ML/MIN/1.73M2 (ref 60–?)
HCT VFR BLD AUTO: 39.7 %
HGB BLD-MCNC: 13.3 G/DL
IMM GRANULOCYTES # BLD AUTO: 0.02 X10(3) UL (ref 0–1)
IMM GRANULOCYTES NFR BLD: 0.2 %
LYMPHOCYTES # BLD AUTO: 1.89 X10(3) UL (ref 1–4)
LYMPHOCYTES NFR BLD AUTO: 22.9 %
MCH RBC QN AUTO: 29.5 PG (ref 26–34)
MCHC RBC AUTO-ENTMCNC: 33.5 G/DL (ref 31–37)
MCV RBC AUTO: 88 FL
MONOCYTES # BLD AUTO: 0.67 X10(3) UL (ref 0.1–1)
MONOCYTES NFR BLD AUTO: 8.1 %
NEUTROPHILS # BLD AUTO: 5.52 X10 (3) UL (ref 1.5–7.7)
NEUTROPHILS # BLD AUTO: 5.52 X10(3) UL (ref 1.5–7.7)
NEUTROPHILS NFR BLD AUTO: 66.7 %
PLATELET # BLD AUTO: 233 10(3)UL (ref 150–450)
RBC # BLD AUTO: 4.51 X10(6)UL
WBC # BLD AUTO: 8.3 X10(3) UL (ref 4–11)

## 2023-05-19 PROCEDURE — 82565 ASSAY OF CREATININE: CPT

## 2023-05-19 PROCEDURE — 36415 COLL VENOUS BLD VENIPUNCTURE: CPT

## 2023-05-19 PROCEDURE — 85652 RBC SED RATE AUTOMATED: CPT

## 2023-05-19 PROCEDURE — 84460 ALANINE AMINO (ALT) (SGPT): CPT

## 2023-05-19 PROCEDURE — 86140 C-REACTIVE PROTEIN: CPT

## 2023-05-19 PROCEDURE — 84450 TRANSFERASE (AST) (SGOT): CPT

## 2023-05-19 PROCEDURE — 85025 COMPLETE CBC W/AUTO DIFF WBC: CPT

## 2023-05-19 PROCEDURE — 82040 ASSAY OF SERUM ALBUMIN: CPT

## 2023-05-25 ENCOUNTER — TELEPHONE (OUTPATIENT)
Dept: RHEUMATOLOGY | Facility: CLINIC | Age: 58
End: 2023-05-25

## 2023-05-25 NOTE — TELEPHONE ENCOUNTER
[facility] contacted at [phone]. PA started over the phone with representative, Demarco Issa. Case #[id number] from 4/25/23 to 5/24/2024.

## 2023-07-11 DIAGNOSIS — M05.79 RHEUMATOID ARTHRITIS INVOLVING MULTIPLE SITES WITH POSITIVE RHEUMATOID FACTOR (HCC): Primary | ICD-10-CM

## 2023-07-11 NOTE — TELEPHONE ENCOUNTER
Disp Refills Start End    XELJANZ XR 11 MG Oral Tablet 24 Hr 30 tablet 5 1/12/2023    LOV: 2/4/23  Future Appointments   Date Time Provider Norma Morley   8/5/2023  8:40 AM Juan R Aranda MD 2014 [facility]     Labs:   Component      Latest Ref Rng 5/19/2023   WBC      4.0 - 11.0 x10(3) uL 8.3    RBC      3.80 - 5.30 x10(6)uL 4.51    Hemoglobin      12.0 - 16.0 g/dL 13.3    Hematocrit      35.0 - 48.0 % 39.7    MCV      80.0 - 100.0 fL 88.0    MCH      26.0 - 34.0 pg 29.5    MCHC      31.0 - 37.0 g/dL 33.5    RDW-SD      35.1 - 46.3 fL 42.6    RDW      11.0 - 15.0 % 13.1    Platelet Count      919.7 - 450.0 10(3)uL 233.0    Prelim Neutrophil Abs      1.50 - 7.70 x10 (3) uL 5.52    Neutrophils Absolute      1.50 - 7.70 x10(3) uL 5.52    Lymphocytes Absolute      1.00 - 4.00 x10(3) uL 1.89    Monocytes Absolute      0.10 - 1.00 x10(3) uL 0.67    Eosinophils Absolute      0.00 - 0.70 x10(3) uL 0.12    Basophils Absolute      0.00 - 0.20 x10(3) uL 0.05    Immature Granulocyte Absolute      0.00 - 1.00 x10(3) uL 0.02    Neutrophils %      % 66.7    Lymphocytes %      % 22.9    Monocytes %      % 8.1    Eosinophils %      % 1.5    Basophils %      % 0.6    Immature Granulocyte %      % 0.2    CREATININE      0.55 - 1.02 mg/dL 0.69    eGFR-Cr      >=60 mL/min/1.73m2 101    Albumin      3.4 - 5.0 g/dL 3.7    ALT (SGPT)      13 - 56 U/L 69 (H)    AST (SGOT)      15 - 37 U/L 39 (H)    C-REACTIVE PROTEIN      <0.30 mg/dL <0.29    SED RATE      0 - 30 mm/Hr 28       Legend:  (H) High    Instructions    You were seen today for RA  Continue Shahla Millard  Blood work May and August  Can see me in August

## 2023-07-12 RX ORDER — TOFACITINIB 11 MG/1
1 TABLET, FILM COATED, EXTENDED RELEASE ORAL DAILY
Qty: 30 TABLET | Refills: 5 | Status: SHIPPED | OUTPATIENT
Start: 2023-07-12

## 2023-07-28 ENCOUNTER — LAB ENCOUNTER (OUTPATIENT)
Dept: LAB | Facility: HOSPITAL | Age: 58
End: 2023-07-28
Attending: INTERNAL MEDICINE
Payer: COMMERCIAL

## 2023-07-28 DIAGNOSIS — M05.79 SEROPOSITIVE RHEUMATOID ARTHRITIS OF MULTIPLE SITES (HCC): ICD-10-CM

## 2023-07-28 DIAGNOSIS — Z51.81 ENCOUNTER FOR THERAPEUTIC DRUG MONITORING: ICD-10-CM

## 2023-07-28 DIAGNOSIS — M17.11 OSTEOARTHRITIS OF RIGHT KNEE: ICD-10-CM

## 2023-07-28 LAB
ALBUMIN SERPL-MCNC: 3.6 G/DL (ref 3.4–5)
ALT SERPL-CCNC: 82 U/L
AST SERPL-CCNC: 39 U/L (ref 15–37)
BASOPHILS # BLD AUTO: 0.04 X10(3) UL (ref 0–0.2)
BASOPHILS NFR BLD AUTO: 0.5 %
CREAT BLD-MCNC: 0.83 MG/DL
CRP SERPL-MCNC: 0.37 MG/DL (ref ?–0.3)
DEPRECATED RDW RBC AUTO: 45.1 FL (ref 35.1–46.3)
EGFRCR SERPLBLD CKD-EPI 2021: 82 ML/MIN/1.73M2 (ref 60–?)
EOSINOPHIL # BLD AUTO: 0.17 X10(3) UL (ref 0–0.7)
EOSINOPHIL NFR BLD AUTO: 2.1 %
ERYTHROCYTE [DISTWIDTH] IN BLOOD BY AUTOMATED COUNT: 13.9 % (ref 11–15)
ERYTHROCYTE [SEDIMENTATION RATE] IN BLOOD: 32 MM/HR
HCT VFR BLD AUTO: 40 %
HGB BLD-MCNC: 13.2 G/DL
IMM GRANULOCYTES # BLD AUTO: 0.02 X10(3) UL (ref 0–1)
IMM GRANULOCYTES NFR BLD: 0.2 %
LYMPHOCYTES # BLD AUTO: 1.66 X10(3) UL (ref 1–4)
LYMPHOCYTES NFR BLD AUTO: 20.4 %
MCH RBC QN AUTO: 29.2 PG (ref 26–34)
MCHC RBC AUTO-ENTMCNC: 33 G/DL (ref 31–37)
MCV RBC AUTO: 88.5 FL
MONOCYTES # BLD AUTO: 0.64 X10(3) UL (ref 0.1–1)
MONOCYTES NFR BLD AUTO: 7.9 %
NEUTROPHILS # BLD AUTO: 5.61 X10 (3) UL (ref 1.5–7.7)
NEUTROPHILS # BLD AUTO: 5.61 X10(3) UL (ref 1.5–7.7)
NEUTROPHILS NFR BLD AUTO: 68.9 %
PLATELET # BLD AUTO: 246 10(3)UL (ref 150–450)
RBC # BLD AUTO: 4.52 X10(6)UL
WBC # BLD AUTO: 8.1 X10(3) UL (ref 4–11)

## 2023-07-28 PROCEDURE — 82565 ASSAY OF CREATININE: CPT

## 2023-07-28 PROCEDURE — 82040 ASSAY OF SERUM ALBUMIN: CPT

## 2023-07-28 PROCEDURE — 84450 TRANSFERASE (AST) (SGOT): CPT

## 2023-07-28 PROCEDURE — 85652 RBC SED RATE AUTOMATED: CPT

## 2023-07-28 PROCEDURE — 85025 COMPLETE CBC W/AUTO DIFF WBC: CPT

## 2023-07-28 PROCEDURE — 86140 C-REACTIVE PROTEIN: CPT

## 2023-07-28 PROCEDURE — 84460 ALANINE AMINO (ALT) (SGPT): CPT

## 2023-07-28 PROCEDURE — 36415 COLL VENOUS BLD VENIPUNCTURE: CPT

## 2023-08-05 ENCOUNTER — HOSPITAL ENCOUNTER (OUTPATIENT)
Dept: GENERAL RADIOLOGY | Facility: HOSPITAL | Age: 58
Discharge: HOME | End: 2023-08-05
Attending: INTERNAL MEDICINE
Payer: COMMERCIAL

## 2023-08-05 ENCOUNTER — OFFICE VISIT (OUTPATIENT)
Dept: RHEUMATOLOGY | Facility: CLINIC | Age: 58
End: 2023-08-05

## 2023-08-05 VITALS — HEART RATE: 65 BPM | SYSTOLIC BLOOD PRESSURE: 110 MMHG | DIASTOLIC BLOOD PRESSURE: 72 MMHG

## 2023-08-05 DIAGNOSIS — Z51.81 MEDICATION MONITORING ENCOUNTER: ICD-10-CM

## 2023-08-05 DIAGNOSIS — R79.89 ELEVATED LFTS: ICD-10-CM

## 2023-08-05 DIAGNOSIS — M05.79 RHEUMATOID ARTHRITIS INVOLVING MULTIPLE SITES WITH POSITIVE RHEUMATOID FACTOR (HCC): ICD-10-CM

## 2023-08-05 DIAGNOSIS — M05.79 RHEUMATOID ARTHRITIS INVOLVING MULTIPLE SITES WITH POSITIVE RHEUMATOID FACTOR (HCC): Primary | ICD-10-CM

## 2023-08-05 PROCEDURE — 3074F SYST BP LT 130 MM HG: CPT | Performed by: INTERNAL MEDICINE

## 2023-08-05 PROCEDURE — 73630 X-RAY EXAM OF FOOT: CPT | Performed by: INTERNAL MEDICINE

## 2023-08-05 PROCEDURE — 3078F DIAST BP <80 MM HG: CPT | Performed by: INTERNAL MEDICINE

## 2023-08-05 PROCEDURE — 99214 OFFICE O/P EST MOD 30 MIN: CPT | Performed by: INTERNAL MEDICINE

## 2023-08-05 RX ORDER — TRETINOIN 0.5 MG/G
CREAM TOPICAL
COMMUNITY

## 2023-08-05 RX ORDER — CLINDAMYCIN PHOSPHATE AND BENZOYL PEROXIDE 10; 50 MG/G; MG/G
1 GEL TOPICAL EVERY MORNING
COMMUNITY

## 2023-08-05 NOTE — PATIENT INSTRUCTIONS
You were seen today for rheumatoid arthritis  Joints are stable  Blood work in November and February  Get ultrasound the liver  Get x-ray of the right foot today due to the pain in your fourth toe  Can see me in February

## 2023-08-26 ENCOUNTER — HOSPITAL ENCOUNTER (OUTPATIENT)
Dept: ULTRASOUND IMAGING | Age: 58
Discharge: HOME | End: 2023-08-26
Attending: INTERNAL MEDICINE
Payer: COMMERCIAL

## 2023-08-26 DIAGNOSIS — Z51.81 MEDICATION MONITORING ENCOUNTER: ICD-10-CM

## 2023-08-26 DIAGNOSIS — R79.89 ELEVATED LFTS: ICD-10-CM

## 2023-08-26 DIAGNOSIS — M05.79 RHEUMATOID ARTHRITIS INVOLVING MULTIPLE SITES WITH POSITIVE RHEUMATOID FACTOR (HCC): ICD-10-CM

## 2023-08-26 PROCEDURE — 76705 ECHO EXAM OF ABDOMEN: CPT | Performed by: INTERNAL MEDICINE

## 2023-11-13 ENCOUNTER — PATIENT MESSAGE (OUTPATIENT)
Dept: RHEUMATOLOGY | Facility: CLINIC | Age: 58
End: 2023-11-13

## 2023-11-17 ENCOUNTER — LAB ENCOUNTER (OUTPATIENT)
Dept: LAB | Facility: HOSPITAL | Age: 58
End: 2023-11-17
Attending: INTERNAL MEDICINE
Payer: COMMERCIAL

## 2023-11-17 DIAGNOSIS — M17.11 OSTEOARTHRITIS OF RIGHT KNEE: ICD-10-CM

## 2023-11-17 DIAGNOSIS — Z51.81 ENCOUNTER FOR THERAPEUTIC DRUG MONITORING: ICD-10-CM

## 2023-11-17 DIAGNOSIS — M05.79 SEROPOSITIVE RHEUMATOID ARTHRITIS OF MULTIPLE SITES (HCC): ICD-10-CM

## 2023-11-17 LAB
ALBUMIN SERPL-MCNC: 4.2 G/DL (ref 3.2–4.8)
ALT SERPL-CCNC: 98 U/L
AST SERPL-CCNC: 45 U/L (ref ?–34)
BASOPHILS # BLD AUTO: 0.05 X10(3) UL (ref 0–0.2)
BASOPHILS NFR BLD AUTO: 0.8 %
CREAT BLD-MCNC: 0.74 MG/DL
CRP SERPL-MCNC: <0.4 MG/DL (ref ?–1)
DEPRECATED RDW RBC AUTO: 43 FL (ref 35.1–46.3)
EGFRCR SERPLBLD CKD-EPI 2021: 94 ML/MIN/1.73M2 (ref 60–?)
EOSINOPHIL # BLD AUTO: 0.23 X10(3) UL (ref 0–0.7)
EOSINOPHIL NFR BLD AUTO: 3.7 %
ERYTHROCYTE [DISTWIDTH] IN BLOOD BY AUTOMATED COUNT: 13.5 % (ref 11–15)
ERYTHROCYTE [SEDIMENTATION RATE] IN BLOOD: 41 MM/HR
HCT VFR BLD AUTO: 40.2 %
HGB BLD-MCNC: 13.3 G/DL
IMM GRANULOCYTES # BLD AUTO: 0.02 X10(3) UL (ref 0–1)
IMM GRANULOCYTES NFR BLD: 0.3 %
LYMPHOCYTES # BLD AUTO: 1.16 X10(3) UL (ref 1–4)
LYMPHOCYTES NFR BLD AUTO: 18.5 %
MCH RBC QN AUTO: 28.7 PG (ref 26–34)
MCHC RBC AUTO-ENTMCNC: 33.1 G/DL (ref 31–37)
MCV RBC AUTO: 86.8 FL
MONOCYTES # BLD AUTO: 0.55 X10(3) UL (ref 0.1–1)
MONOCYTES NFR BLD AUTO: 8.8 %
NEUTROPHILS # BLD AUTO: 4.25 X10 (3) UL (ref 1.5–7.7)
NEUTROPHILS # BLD AUTO: 4.25 X10(3) UL (ref 1.5–7.7)
NEUTROPHILS NFR BLD AUTO: 67.9 %
PLATELET # BLD AUTO: 248 10(3)UL (ref 150–450)
RBC # BLD AUTO: 4.63 X10(6)UL
WBC # BLD AUTO: 6.3 X10(3) UL (ref 4–11)

## 2023-11-17 PROCEDURE — 85652 RBC SED RATE AUTOMATED: CPT

## 2023-11-17 PROCEDURE — 84450 TRANSFERASE (AST) (SGOT): CPT

## 2023-11-17 PROCEDURE — 82565 ASSAY OF CREATININE: CPT

## 2023-11-17 PROCEDURE — 84460 ALANINE AMINO (ALT) (SGPT): CPT

## 2023-11-17 PROCEDURE — 86140 C-REACTIVE PROTEIN: CPT

## 2023-11-17 PROCEDURE — 82040 ASSAY OF SERUM ALBUMIN: CPT

## 2023-11-17 PROCEDURE — 85025 COMPLETE CBC W/AUTO DIFF WBC: CPT

## 2023-11-17 PROCEDURE — 36415 COLL VENOUS BLD VENIPUNCTURE: CPT

## 2023-12-15 DIAGNOSIS — M05.79 RHEUMATOID ARTHRITIS INVOLVING MULTIPLE SITES WITH POSITIVE RHEUMATOID FACTOR (HCC): ICD-10-CM

## 2023-12-15 RX ORDER — TOFACITINIB 11 MG/1
1 TABLET, FILM COATED, EXTENDED RELEASE ORAL DAILY
Qty: 30 TABLET | Refills: 5 | Status: SHIPPED | OUTPATIENT
Start: 2023-12-15

## 2023-12-15 NOTE — TELEPHONE ENCOUNTER
LOV:  8/5/2023  Future Appointments   Date Time Provider Norma Morley   2/3/2024  9:40 AM Hakan Huerta MD 2014 [facility]     Labs:    Component      Latest Ref Rng 11/17/2023   WBC      4.0 - 11.0 x10(3) uL 6.3    RBC      3.80 - 5.30 x10(6)uL 4.63    Hemoglobin      12.0 - 16.0 g/dL 13.3    Hematocrit      35.0 - 48.0 % 40.2    MCV      80.0 - 100.0 fL 86.8    MCH      26.0 - 34.0 pg 28.7    MCHC      31.0 - 37.0 g/dL 33.1    RDW-SD      35.1 - 46.3 fL 43.0    RDW      11.0 - 15.0 % 13.5    Platelet Count      334.2 - 450.0 10(3)uL 248.0    Prelim Neutrophil Abs      1.50 - 7.70 x10 (3) uL 4.25    Neutrophils Absolute      1.50 - 7.70 x10(3) uL 4.25    Lymphocytes Absolute      1.00 - 4.00 x10(3) uL 1.16    Monocytes Absolute      0.10 - 1.00 x10(3) uL 0.55    Eosinophils Absolute      0.00 - 0.70 x10(3) uL 0.23    Basophils Absolute      0.00 - 0.20 x10(3) uL 0.05    Immature Granulocyte Absolute      0.00 - 1.00 x10(3) uL 0.02    Neutrophils %      % 67.9    Lymphocytes %      % 18.5    Monocytes %      % 8.8    Eosinophils %      % 3.7    Basophils %      % 0.8    Immature Granulocyte %      % 0.3    CREATININE      0.55 - 1.02 mg/dL 0.74    EGFR      >=60 mL/min/1.73m2 94    SED RATE      0 - 30 mm/Hr 41 (H)    C-REACTIVE PROTEIN      <1.00 mg/dL <0.40    AST (SGOT)      <=34 U/L 45 (H)    ALT (SGPT)      10 - 49 U/L 98 (H)    Albumin      3.2 - 4.8 g/dL 4.2       Legend:  (H) High

## 2023-12-15 NOTE — TELEPHONE ENCOUNTER
Current Outpatient Medications   Medication Sig Dispense Refill    XELJANZ XR 11 MG Oral Tablet 24 Hr Take 1 tablet by mouth daily.  30 tablet 5

## 2024-01-26 ENCOUNTER — LAB ENCOUNTER (OUTPATIENT)
Dept: LAB | Facility: HOSPITAL | Age: 59
End: 2024-01-26
Attending: INTERNAL MEDICINE
Payer: COMMERCIAL

## 2024-01-26 DIAGNOSIS — M05.79 SEROPOSITIVE RHEUMATOID ARTHRITIS OF MULTIPLE SITES (HCC): ICD-10-CM

## 2024-01-26 DIAGNOSIS — Z51.81 ENCOUNTER FOR THERAPEUTIC DRUG MONITORING: ICD-10-CM

## 2024-01-26 DIAGNOSIS — M17.11 OSTEOARTHRITIS OF RIGHT KNEE: ICD-10-CM

## 2024-01-26 LAB
ALBUMIN SERPL-MCNC: 4.6 G/DL (ref 3.2–4.8)
ALT SERPL-CCNC: 91 U/L
AST SERPL-CCNC: 62 U/L (ref ?–34)
BASOPHILS # BLD AUTO: 0.05 X10(3) UL (ref 0–0.2)
BASOPHILS NFR BLD AUTO: 0.6 %
CREAT BLD-MCNC: 0.8 MG/DL
CRP SERPL-MCNC: <0.4 MG/DL (ref ?–1)
DEPRECATED RDW RBC AUTO: 41.8 FL (ref 35.1–46.3)
EGFRCR SERPLBLD CKD-EPI 2021: 86 ML/MIN/1.73M2 (ref 60–?)
EOSINOPHIL # BLD AUTO: 0.14 X10(3) UL (ref 0–0.7)
EOSINOPHIL NFR BLD AUTO: 1.8 %
ERYTHROCYTE [DISTWIDTH] IN BLOOD BY AUTOMATED COUNT: 13.5 % (ref 11–15)
ERYTHROCYTE [SEDIMENTATION RATE] IN BLOOD: 44 MM/HR
HCT VFR BLD AUTO: 41.4 %
HGB BLD-MCNC: 14.1 G/DL
IMM GRANULOCYTES # BLD AUTO: 0.02 X10(3) UL (ref 0–1)
IMM GRANULOCYTES NFR BLD: 0.3 %
LYMPHOCYTES # BLD AUTO: 1.85 X10(3) UL (ref 1–4)
LYMPHOCYTES NFR BLD AUTO: 23.6 %
MCH RBC QN AUTO: 29.1 PG (ref 26–34)
MCHC RBC AUTO-ENTMCNC: 34.1 G/DL (ref 31–37)
MCV RBC AUTO: 85.5 FL
MONOCYTES # BLD AUTO: 0.55 X10(3) UL (ref 0.1–1)
MONOCYTES NFR BLD AUTO: 7 %
NEUTROPHILS # BLD AUTO: 5.23 X10 (3) UL (ref 1.5–7.7)
NEUTROPHILS # BLD AUTO: 5.23 X10(3) UL (ref 1.5–7.7)
NEUTROPHILS NFR BLD AUTO: 66.7 %
PLATELET # BLD AUTO: 265 10(3)UL (ref 150–450)
RBC # BLD AUTO: 4.84 X10(6)UL
WBC # BLD AUTO: 7.8 X10(3) UL (ref 4–11)

## 2024-01-26 PROCEDURE — 85652 RBC SED RATE AUTOMATED: CPT

## 2024-01-26 PROCEDURE — 85025 COMPLETE CBC W/AUTO DIFF WBC: CPT

## 2024-01-26 PROCEDURE — 86140 C-REACTIVE PROTEIN: CPT

## 2024-01-26 PROCEDURE — 82565 ASSAY OF CREATININE: CPT

## 2024-01-26 PROCEDURE — 36415 COLL VENOUS BLD VENIPUNCTURE: CPT

## 2024-01-26 PROCEDURE — 84460 ALANINE AMINO (ALT) (SGPT): CPT

## 2024-01-26 PROCEDURE — 82040 ASSAY OF SERUM ALBUMIN: CPT

## 2024-01-26 PROCEDURE — 84450 TRANSFERASE (AST) (SGOT): CPT

## 2024-02-03 ENCOUNTER — OFFICE VISIT (OUTPATIENT)
Dept: RHEUMATOLOGY | Facility: CLINIC | Age: 59
End: 2024-02-03
Payer: COMMERCIAL

## 2024-02-03 VITALS
DIASTOLIC BLOOD PRESSURE: 73 MMHG | BODY MASS INDEX: 38.93 KG/M2 | SYSTOLIC BLOOD PRESSURE: 106 MMHG | HEIGHT: 64 IN | WEIGHT: 228 LBS | HEART RATE: 78 BPM

## 2024-02-03 DIAGNOSIS — Z51.81 MEDICATION MONITORING ENCOUNTER: ICD-10-CM

## 2024-02-03 DIAGNOSIS — M05.79 RHEUMATOID ARTHRITIS INVOLVING MULTIPLE SITES WITH POSITIVE RHEUMATOID FACTOR (HCC): Primary | ICD-10-CM

## 2024-02-03 DIAGNOSIS — R79.89 ELEVATED LFTS: ICD-10-CM

## 2024-02-03 PROCEDURE — 3074F SYST BP LT 130 MM HG: CPT | Performed by: INTERNAL MEDICINE

## 2024-02-03 PROCEDURE — 3008F BODY MASS INDEX DOCD: CPT | Performed by: INTERNAL MEDICINE

## 2024-02-03 PROCEDURE — 99214 OFFICE O/P EST MOD 30 MIN: CPT | Performed by: INTERNAL MEDICINE

## 2024-02-03 PROCEDURE — 3078F DIAST BP <80 MM HG: CPT | Performed by: INTERNAL MEDICINE

## 2024-02-03 NOTE — PROGRESS NOTES
Nathalie Reed is a 58 year old female.    HPI:     Chief Complaint   Patient presents with    Rheumatoid Arthritis     Nathalie presents today 2/3/2024 for Seropositive RA.     Current Medications:  Xeljanz 11 mg daily- started Feb 2020  Previous medications:  MTX 6 pills weekly and FA daily- started Dec 2019 b/c of hair loss and fatigue  mobic 15 mg daily  Blood work:  Neg CLAUDIA with RAVI panel, CCP  , CRP 0.66  MRI R knee: mild chondromalacia    Interval History:  This is a  56 yo F with hx of HLD, CAD s/p Bypass, Basal cell carcinoma (last one 2018), Depression presents for f/u joint pain and swelling and +RF.  In March she developed left ankle pain and swelling.  She injured her left ankle about 3 years ago, had some ligament tears but it healed.  Denies any recent trauma or fall.  In September she started to develop right knee and right ankle pain and swelling but she did fall in her shower likely contributing to her symptoms.  She was given Medrol Dosepak which helped her pain and swelling.  She also reports bilateral shoulder stiffness.  Denies any pain or swelling in her hands, wrists, elbows, hips or feet.  She has been seen by orthopedics for bilateral knee pain and x-rays were done which were normal.  MRI was also done of the knee showing mild chondromalacia.    8/7/2021:  Presents for f/u of newly dx Seropositive RA (+RF)  On Xeljanz since Feb 2020  LFTs better, AST now normal and Alt 92. Normal Inflammatory markers  US liver is normal  Is worsening right knee pain.  At times it hurts to go up and down stairs.  She had an MRI done in 2019 showing patellar chondromalacia  Reports minimal left ankle pain  Denies any other joint pain or swelling    3/5/2022:  Presents for f/u of newly dx Seropositive RA (+RF)  On Xeljanz since Feb 2020  Reviewed blood work with patient, continues to have slightly elevated liver enzymes.  They were normal back in March 2020  She had ultrasound done of her liver back  in March 2021 and it was normal  Joints are doing well overall.  At times will have some left ankle pain but otherwise no pain or swelling in her other joints    8/6/2022:  Presents for f/u of newly dx Seropositive RA (+RF)  On Xeljanz   Recent blood work showed mildly elevated ALT of 57, this has been chronic but now much improve  Overall doing very well.  Minimal stiffness in her joints  Continues to have chronic left ankle pain but again very minimal    2/4/2023:  Presents for f/u of newly dx Seropositive RA (+RF)  On Xeljanz   Recent blood work showed mildly elevated ALT of 89. Also ESR elevated 40. Had a sore throat and had to be off for 3 days  Joints are stable, no swelling or pain. No pain in the ankles     8/5/2023:  Presents for f/u of newly dx Seropositive RA (+RF)  On Xeljanz daily   Continues to have elevated LFTs. US liver in 2021 was normal   Joint are stable, some achinness but no swelling   Has pain in right foot, 5th toe, jammed it.  Happened around July 4th weekend but still painful    2/3/2024:  Presents for f/u of newly dx Seropositive RA (+RF)  On Xeljanz daily   Continues to have elevated LFTs. US liver in 2021 was normal and repeat in 2023 was normal  Joint are stable, some achinness but no swelling   No rashes or psoriasis            HISTORY:  Past Medical History:   Diagnosis Date    ASTHMA     Atherosclerosis of coronary artery     Cancer (HCC)     Basal Cell Carcinoma    HEART DISEASE 11/2006    2v CABG    Other and unspecified hyperlipidemia       Social Hx Reviewed   Family Hx Reviewed     Medications (Active prior to today's visit):  Current Outpatient Medications   Medication Sig Dispense Refill    XELJANZ XR 11 MG Oral Tablet 24 Hr Take 1 tablet by mouth daily. 30 tablet 5    Clindamycin Phos-Benzoyl Perox 1.2-5 % External Gel Apply 1 Application topically every morning.      Tretinoin 0.05 % External Cream Apply a pea size amount to entire face nightly as tolerated      ZOLPIDEM 5  MG Oral Tab TAKE  1 TABLET BY MOUTH   AT BEDTIME  AS  NEEDED  FOR SLEEP 30 tablet 5    Cholecalciferol (VITAMIN D3) 50 MCG (2000 UT) Oral Chew Tab       VENLAFAXINE 150 MG Oral Capsule SR 24 Hr TAKE ONE CAPSULE BY MOUTH ONE TIME DAILY 90 capsule 3    Clobetasol Propionate 0.05 % External Ointment Apply to AA on hands and ears BID x1-2 week, then taper to prn. 60 g 2    Vitamin B-12 1000 MCG Oral Tab Take 1 tablet (1,000 mcg total) by mouth daily.      aspirin 81 MG Oral Tab Take 1 tablet (81 mg total) by mouth daily.      rosuvastatin (CRESTOR) 20 MG Oral Tab Take 1 tablet (20 mg total) by mouth daily. 90 tablet 3    Fluocinonide 0.05 % External Solution Use on scalp BID x 1-2 weeks, then taper to prn (Patient not taking: Reported on 2/3/2024) 60 mL 0     .cmed  Allergies:  Allergies   Allergen Reactions    Lipitor [Atorvastatin Calcium] RASH    Niacin FACE FLUSHING         ROS:   All other ROS are negative.     PHYSICAL EXAM:   GEN: AAOx3, NAD  HEENT: EOMI, PERRLA, no injection or icterus, oral mucosa moist, no oral lesions. No lymphadenopathy. No facial rash  CVS: RRR, no murmurs rubs or gallops. Equal 2+ distal pulses.   LUNGS: CTAB, no increased work of breathing  ABDOMEN:  soft NT/ND, +BS, no HSM  SKIN: No rashes or skin lesions. No nail findings  MSK:  Cervical spine: FROM  Hands: no synovitis in DIP, PIP and MCP, strong full fists  Wrist: FROM, no pain or swelling or warmth on palpation  Elbow: FROM, no pain or swelling or warmth on palpation  Shoulders: FROM, no pain or swelling or warmth on palpation  Hip: normal log roll, no lateral hip pain, MARCO A test negative b/l  Knees: FROM, no warmth or effusion present. No pain with ROM.   Ankles: FROM, no pain or swelling or warmth on palpation  Feet: R 5th toe TTP  Spine: no lumbar or sacral pain on palpation.  NEURO: Cranial nerves II-XII intact grossly. 5/5 strength throughout in both upper and lower extremities, sensation intact.  PSYCH: normal mood    LABS:      Component      Latest Ref Rng & Units 10/26/2020 3/27/2020   Patient Fasting?       Yes    Glucose      74 - 109 mg/dL 112 (H)    BUN      6.0 - 20.0 mg/dL 21.0 (H)    CREATININE      0.50 - 0.90 mg/dL 0.71    BUN/CREAT Ratio      10.0 - 20.0 30.0 (H)    Sodium      136 - 145 mmol/L 141    Potassium      3.50 - 5.10 mmol/L 4.38    Chloride      98 - 107 mmol/L 103    Carbon Dioxide, Total      22.0 - 29.0 mmol/L 26.8    CALCIUM      8.6 - 10.3 mg/dL 9.5    TOTAL PROTEIN      6.4 - 8.3 g/dL 7.4    Albumin      3.5 - 5.2 g/dL 4.5    Total Bilirubin      0.00 - 1.20 mg/dL 0.32    ALKALINE PHOSPHATASE      41 - 108 U/L 151 (H)    AST (SGOT)      0 - 32 U/L 52 (H) 28   ALT (SGPT)      0 - 33 U/L 82 (H) 44 (H)   GFR CKD-EPI      >=60.00 mL/min/1.73 m² 96.87        Imaging:     US Liver 3/2021:  IMPRESSION:   1. Unremarkable liver ultrasound. Stable exam since the previous study.    MRI R knee 11/2019:  CONCLUSION:   1. Mild chondromalacia patella.  2. Otherwise no acute internal right knee joint derangement.     ASSESSMENT/PLAN:       Seropositive RA (+RF)- stable   - Symptoms have significantly improved since starting Xeljanz  - Continue Xeljanz 11 mg daily, refilled   - Blood work reviewed continues to show mildly elevated LFTs  - blood work in 3-4 mos     Mildly elevated LFTs  - US liver normal 2021, repeat in 2023 normal   - Liver enzymes were normal back in March 2020.  She started Xeljanz in February 2020.  Slightly elevated liver enzymes are likely from Xeljanz.  We will continue to monitor while she is on the medication  - I will have her see GI due to the chronically elevated LFTs    R knee pain 2/2 OA  - X-ray in 2019 does show evidence of mild OA  - Recommend to take Tylenol arthritis 650 mg once or twice a day.  Also recommended CBD cream.  She is hesitant to do any injections    Follow up in 6 mos    Norah Rodríguez MD  2/3/2024  9:56 AM

## 2024-02-03 NOTE — PATIENT INSTRUCTIONS
You were seen today for rheumatoid arthritis  Joints are stable  Continue Xeljanz  Blood work every 4 months  Can see me in 6 months    Please see the GI doctor due to chronic elevated liver enzymes.  You had 2 ultrasounds in 2021 and 2023 with a liver ultrasound was normal.

## 2024-04-09 ENCOUNTER — OFFICE VISIT (OUTPATIENT)
Dept: SURGERY | Facility: CLINIC | Age: 59
End: 2024-04-09
Payer: COMMERCIAL

## 2024-04-09 VITALS
HEIGHT: 64.8 IN | SYSTOLIC BLOOD PRESSURE: 144 MMHG | HEART RATE: 73 BPM | RESPIRATION RATE: 16 BRPM | DIASTOLIC BLOOD PRESSURE: 87 MMHG | WEIGHT: 219.63 LBS | TEMPERATURE: 98 F | BODY MASS INDEX: 36.59 KG/M2 | OXYGEN SATURATION: 95 %

## 2024-04-09 DIAGNOSIS — C50.911 MALIGNANT NEOPLASM OF RIGHT FEMALE BREAST, UNSPECIFIED ESTROGEN RECEPTOR STATUS, UNSPECIFIED SITE OF BREAST (HCC): Primary | ICD-10-CM

## 2024-04-09 PROCEDURE — 3008F BODY MASS INDEX DOCD: CPT | Performed by: SURGERY

## 2024-04-09 PROCEDURE — 99244 OFF/OP CNSLTJ NEW/EST MOD 40: CPT | Performed by: SURGERY

## 2024-04-09 PROCEDURE — 3079F DIAST BP 80-89 MM HG: CPT | Performed by: SURGERY

## 2024-04-09 PROCEDURE — 3077F SYST BP >= 140 MM HG: CPT | Performed by: SURGERY

## 2024-04-09 NOTE — PATIENT INSTRUCTIONS
Surgeon:         Dr. Raciel Jain                                        Tel:         [phone]                                  Fax:        [phone]    Surgery/Procedure: Immediate breast reconstruction with placement of bilateral breast tissue expanders and acellular dermal matrix. 1.5 hours, general anesthesia, outpatient. Joint with Dr. Trujillo.  Please request pre-op pec block per anesthesia.     Hospital:  [facility]: [address] [place] [address]           [phone]  [facility]: [address] [place] [address]               [phone]    1. Someone will need to drive you to and from the hospital if your procedure is outpatient.    2.Do not drink alcohol or smoke 24 hours prior to your procedure.    3. Bring a picture ID and your insurance card.    4. You will be contacted by the hospital the day before to confirm the procedure time and location.     5. Do not take any herbal supplements or blood thinners at least one week before your procedure/surgery. This includes NSAID's (aspirin, baby aspirin, Motrin, Ibuprofen, Aleve, Advil, Naproxen, etc), Plavix, fish oil, vitamin E, turmeric, CoQ10, or green tea supplements, etc. *TYLENOL or acetaminophen is ok to take*    6. PRE-OPERATIVE TESTING: History and physical with medical clearance is REQUIRED within 30 days of the surgery date and is mandatory per Dr. Jain. *If this is not done, your surgery will be postponed*  MEDICAL CLEARANCE WITH DR. Bustillo  CBC  CMP  EKG (within 90 days)  *Ask prescribing provider of JOSAFAT on pre operative and post operative management*     7. If you take Coumadin, Plavix, Xarelto, or Eliquis, please contact your prescribing physician for special instructions on how long to hold. If you take insulin, contact your primary care physician for special instructions.     8. Please inform us if you start or change any medications at least one week before surgery (ex: blood thinners, weight loss  medications, diabetic medications, herbal supplements, etc)    9. Does patient have diagnosis of sleep apnea?    [   ] Yes     [ X  ]  No    Consent obtained  Photos taken on 4/9/2024

## 2024-04-09 NOTE — CONSULTS
New Patient Consultation    This is the first visit for this 58 year old female who presents to discuss reconstructive options following surgery for breast cancer.    History of Present Illness:   The patient is a 58 year old female who presents with a right breast cancer found by radiographic abnormality.  The patient ultimately underwent biopsy which revealed invasive ductal carcinoma.  She does not have breast pain. She does not have family history of breast cancer. She does not have significant past history for breast diseases or breast surgery.  She wears a 40 B bra.  The patient was seen by Dr. Trujillo and has elected to undergo a bilateral skin-sparing mastectomy.  She is here today to discuss post-mastectomy reconstructive options.    Past Medical History:      Past Medical History:   Diagnosis Date    ASTHMA     Atherosclerosis of coronary artery     Cancer (HCC)     Basal Cell Carcinoma    HEART DISEASE 11/2006    2v CABG    Other and unspecified hyperlipidemia          Past Surgical History:  Past Surgical History:   Procedure Laterality Date    CABG  2006    cabg x 2    HYSTEROSCOPY      myomectomy    OTHER SURGICAL HISTORY      LSC ectopic    OTHER SURGICAL HISTORY  12.27.12    Prep of wound to right cheek adjacent tissue rearrangment to right cheek GSH/    SKIN SURGERY  12/13/12    BCC nod / R inf. eyelid / Mohs surgery by Dr. Reed    TUBAL LIGATION           Medications:    No outpatient medications have been marked as taking for the 4/9/24 encounter (Appointment) with Raciel Jain MD.         Allergies:    Allergies   Allergen Reactions    Lipitor [Atorvastatin Calcium] RASH    Niacin FACE FLUSHING         Family History:   Family History   Problem Relation Age of Onset    Other (Other) Father         low hdl    Cancer Mother         leukemia    Heart Disorder Maternal Grandfather 50         Social History:  History   Alcohol Use    Yes     Comment: social       History   Smoking Status     Former    Packs/day: 0.75    Years: 23.00    Types: Cigarettes    Start date: 1983    Quit date: 7/7/2006   Smokeless Tobacco    Never       History   Drug Use No           Review of Systems:    General:   The patient denies, fever, chills, night sweats, fatigue, generalized weakness, change in appetite, weight loss, or weight gain.    Endocrine:  There is no history of poor/slow wound healing, weight loss/gain, fertility or hormone problems, cold intolerance, heat intolerance, excessive thirst, or thyroid disease.    Allergic/Immunologic:  There is no history of hives, hay fever, angioedema or anaphylaxis.    HEENT:  The patient denies ear pain, ear drainage, hearing loss, change in vision, double vision, cataracts, glaucoma, nasal congestion, nosebleed, hoarseness, sore throat, or swollen glands.    Respiratory:  The patient denies shortness of breath, cough, bloody cough, phlegm, asthma, or wheezing.    Cardiovascular:   The patient denies chest pain/pressure, palpitations, irregular heartbeat, high blood pressure, stroke, or leg swelling.    Breasts:  The patient denies breast masses, pain, change in the breast skin, skin dimpling, nipple discharge, or rash.    Gastrointestinal:  There is no history of difficulty or pain with swallowing, reflux symptoms, nausea, vomiting, dark/bloody stools, diarrhea, constipation, change in bowel habits, or abdominal pain.    Genitourinary:  The patient denies frequent urination, needing to get up at night to urinate, urinary hesitancy or retaining urine, painful urination, urinary incontinence, decreased urine stream, blood in urine, or vaginal/penile discharge.    Skin:  Then patient denies rash, itching, skin lesions, dry skin, change in skin color or change in moles, sunburn with blistering.    Hematologic/Lymphatic:  The patient denies easily bruising or bleeding, persistent swollen glands or lymph nodes, bleeding disorders, blood clots, or pulmonary  embolism.    Gynecologic:  The patient denies irregular menses, pelvic pain, pain with intercourse, painful menses, or pregnancy.    Musculoskeletal:  The patient denies muscle aches/pain, joint pain, stiff joints, neck pain, back pain or bone pain.    Neurologic:  There is no history of migraines or severe headaches, seizure/epilepsy, speech problems, coordination problems, trembling/tremors, fainting/black outs, dizziness, memory problems, loss of sensation/numbness, problems walking, weakness, tingling or burning in hands/feet.     Psychiatric:  There is no history of abusive relationship, bipolar disorder, sleep disturbance, anxiety, depression or feeling of despair.         Physical Exam:  Vitals:    04/09/24 1336   BP: 144/87   Pulse: 73   Resp: 16   Temp: 98.1 °F (36.7 °C)     Body mass index is 36.77 kg/m².      The patient is awake, alert, and oriented.  She is well-nourished, well-developed woman who appears her stated age. Her speech patterns and movements are normal. Her affect is appropriate.    HEENT: The head is normocephalic. The neck is supple. The thyroid is not enlarged and is without palpable masses.  The trachea is in the midline. Conjunctiva are clear, non-icteric.    Right breast: Grade 2 ptosis is noted.  Striae are noted.  Measurements: sternal notch to nipple 30cm, nipple to inframammary fold 11cm, base diameter 17cm.  The skin, nipple, and areola appear normal.  There is no nipple retraction or discharge.  There are no dominant masses in the breast.     Left breast:  Grade 2 ptosis is noted.  Striae are noted. Measurements:  sternal notch to nipple 30cm, nipple to inframammary fold 11cm, base diameter 17cm.  The skin, nipple, and areola appear normal.  There is no nipple retraction or discharge.  There are no dominant masses in the breast.      Abdomen:  A moderate abdominal pannus with striae is noted.  No palpable hernia is detected.     Lymph Nodes:  There is no cervical,  supraclavicular, or axillary lymphadenopathy appreciated.    Back: There is no vertebral column tenderness.    Skin: The skin appears normal. There are no suspicious appearing rashes or lesions.    Extremities: The extremities are without deformity, cyanosis or edema.    Impression:   The patient is a 58 year old female who presents with a right breast cancer is awaiting bilateral skin-sparing mastectomy.     Discussion and Plan:  We reviewed the options for post-mastectomy reconstruction and discussed the risks/benefits of timing (immediate versus delayed) and technique (implant-based versus autologous).  Given the patient's cardiac history and history of open heart surgery, she is not ideal candidate for abdominal free flap reconstruction.  As such, the majority of discussion was focused on implant-based reconstruction.    Specifically, the nature and technique of tissue expander reconstruction was reviewed with the patient. We discussed the partial submuscular placement of the tissue expander, use of acellular matrix, and placement of drains.  We discussed that use of acellular dermal matrix in  breast reconstruction is considered an \"off label\" use.  The expansion process, as well as the need for a secondary procedure for placement of a permanent implant, were reviewed.  Representative illustrations and photographs were demonstrated to the patient. The risks of surgery including but not limited to bleeding, infection, scarring, delayed wound healing, seroma, asymmetry, implant infection/extrusion requiring removal, injury to adjacent structures, capsular contracture, ALCL, breast implant associated illness, need for implant exchange, and need for further surgery were reviewed. The expected post-operative course was discussed including the need for activity limitation, drains, and suture removal.  The recommended FDA surveillance protocol for silicone implants was reviewed.  Finally, we reviewed the impact of  post-mastectomy radiation therapy on implant-based reconstruction (should it be deemed necessary based on the final pathology results), including increased risk of reconstructive loss and capsular contracture.     Finally, we discussed the possible need for revisions, the process of nipple areolar reconstruction, and a contralateral balancing procedure (augmentation, mastopexy, reduction). Multiple questions were answered to the patient's satisfaction. No guarantees as to outcome were offered. The patient presses understanding and wishes to proceed with immediate tissue expander reconstruction. A total of 60 minutes was spent reviewing the patient's history and diagnostic testing, examining the patient, reviewing reconstructive options, and coordinating the patient's care.

## 2024-04-10 DIAGNOSIS — C50.911 MALIGNANT NEOPLASM OF RIGHT FEMALE BREAST, UNSPECIFIED ESTROGEN RECEPTOR STATUS, UNSPECIFIED SITE OF BREAST (HCC): Primary | ICD-10-CM

## 2024-04-22 ENCOUNTER — TELEPHONE (OUTPATIENT)
Dept: GENERAL RADIOLOGY | Facility: HOSPITAL | Age: 59
End: 2024-04-22

## 2024-04-22 NOTE — TELEPHONE ENCOUNTER
1215: Spoke with Nathalie Trinidaddebby   Introduced myself as one of the breast nurse coordinators at [facility] and informed Ms. Reed of the purpose of my call.  Discussed sentinel lymph node mapping procedure to be done in the nuclear medicine department Tuesday, May 28  DAY BEFORE SURGERY. Procedure explained. Ms. Reed instructed to arrive 45 minutes prior to her appointment time, park in the [facility], enter the hospital though the [place] entrance.   All questions answered to the best of my ability.  Encouraged Nathalie Reed to contact the breast center if she has questions or concerns prior to her surgery date. Phone number provided.  Ms. Reed verbalized agreement and gratitude for the call.

## 2024-04-30 RX ORDER — HEPARIN SODIUM 5000 [USP'U]/ML
5000 INJECTION, SOLUTION INTRAVENOUS; SUBCUTANEOUS ONCE
Status: CANCELLED | OUTPATIENT
Start: 2024-04-30 | End: 2024-04-30

## 2024-05-14 ENCOUNTER — NURSE ONLY (OUTPATIENT)
Dept: SURGERY | Facility: CLINIC | Age: 59
End: 2024-05-14

## 2024-05-14 PROCEDURE — 99024 POSTOP FOLLOW-UP VISIT: CPT | Performed by: SURGERY

## 2024-05-14 NOTE — PROGRESS NOTES
Patient presented to the office for nurse visit for Pre-Op Tissue Expander education session prior to their upcoming surgery. Patient was accompanied by her care partner.   Tissue Expander presentation was shown. Ample time was spent with patient discussing the nature of the procedure as well was the expected tissue expansion process, timeframe, and second stage reconstruction options. The patient was also educated on activity restrictions, drain care, post op appts,  and post op medications. Educational illustrations and photographs were reviewed with the patient.   The reasons to call our office with post operative complications was discussed and included, but are not limited to, infection, swelling, drain complications, excessive bruising or bleeding.   Multiple questions were answered to the patient's satisfaction.The patient was provided with a copy of the presentation as well as post operative supplies.

## 2024-05-21 ENCOUNTER — TELEPHONE (OUTPATIENT)
Dept: SURGERY | Facility: CLINIC | Age: 59
End: 2024-05-21

## 2024-05-28 ENCOUNTER — HOSPITAL ENCOUNTER (OUTPATIENT)
Dept: NUCLEAR MEDICINE | Facility: HOSPITAL | Age: 59
Discharge: HOME | End: 2024-05-28
Attending: SURGERY
Payer: COMMERCIAL

## 2024-05-28 ENCOUNTER — ANESTHESIA EVENT (OUTPATIENT)
Dept: SURGERY | Facility: HOSPITAL | Age: 59
End: 2024-05-28

## 2024-05-28 DIAGNOSIS — C50.911 MALIGNANT NEOPLASM OF RIGHT BREAST IN FEMALE, ESTROGEN RECEPTOR NEGATIVE (HCC): ICD-10-CM

## 2024-05-28 DIAGNOSIS — Z17.1 MALIGNANT NEOPLASM OF RIGHT BREAST IN FEMALE, ESTROGEN RECEPTOR NEGATIVE (HCC): ICD-10-CM

## 2024-05-28 PROCEDURE — 78195 LYMPH SYSTEM IMAGING: CPT | Performed by: SURGERY

## 2024-05-28 RX ORDER — LIDOCAINE AND PRILOCAINE 25; 25 MG/G; MG/G
CREAM TOPICAL
Status: DISCONTINUED
Start: 2024-05-28 | End: 2024-05-29

## 2024-05-28 NOTE — ANESTHESIA PREPROCEDURE EVALUATION
PRE-OP EVALUATION    Patient Name: Nathalie Reed    Admit Diagnosis: MALIGNANT NEOPLASM OF RIGHT BREAST, FEMALE    Pre-op Diagnosis: MALIGNANT NEOPLASM OF RIGHT BREAST, FEMALE    BILATERAL BREAST TOTAL MASTECTOMY WITH RIGHT SENTINEL LYMPH NODE BIOPSY POSSIBLE RIGHT AXILLARY LYMPH NODE DISSECTION (LINN) Immediate breast reconstruction with placement of bilateral breast tissue expanders and acellular dermal matrix (CHASE)    Anesthesia Procedure: BILATERAL BREAST TOTAL MASTECTOMY WITH RIGHT SENTINEL LYMPH NODE BIOPSY POSSIBLE RIGHT AXILLARY LYMPH NODE DISSECTION (LINN) Immediate breast reconstruction with placement of bilateral breast tissue expanders and acellular dermal matrix (CHASE) (Bilateral)  .    Surgeons and Role:  Panel 1:     * Thaddeus Trujillo MD - Primary  Panel 2:     * Raciel Jain MD - Primary    Pre-op vitals reviewed.        Body mass index is 36.73 kg/m².    Current medications reviewed.  Hospital Medications:   [START ON 5/29/2024] ceFAZolin (Ancef) 1 g, gentamicin (Garamycin) 80 mg in sodium chloride 0.9% 1,000 mL irrigation   Irrigation Once (Intra-Op)       Outpatient Medications:     No medications prior to admission.       Allergies: Lipitor [atorvastatin calcium] and Niacin      Anesthesia Evaluation    Patient summary reviewed.    Anesthetic Complications  (-) history of anesthetic complications         GI/Hepatic/Renal                                 Cardiovascular      ECG reviewed.  Exercise tolerance: good     MET: >4         (+) hyperlipidemia  (+) CAD    (+) CABG/stent                            Endo/Other                                  Pulmonary      (+) asthma                     Neuro/Psych                              Patient Active Problem List:     Pure hypercholesterolemia     Coronary atherosclerosis     Personal history of other malignant neoplasm of skin     Dyslipidemia     Sprain of left ankle, unspecified ligament,      Hx of CABG            Past  Surgical History:   Procedure Laterality Date    Cabg  2006    cabg x 2    Hysteroscopy      myomectomy    Other surgical history      LSC ectopic    Other surgical history  12    Prep of wound to right cheek adjacent tissue rearrangment to right cheek ISAIAH/    Skin surgery  12    BCC nod / R inf. eyelid / Mohs surgery by Dr. Reed    Tubal ligation       Social History     Socioeconomic History    Marital status:    Occupational History    Occupation: claim adjustor transciptionist   Tobacco Use    Smoking status: Former     Current packs/day: 0.00     Average packs/day: 0.7 packs/day for 28.0 years (21.0 ttl pk-yrs)     Types: Cigarettes     Start date:      Quit date: 2006     Years since quittin.9    Smokeless tobacco: Never    Tobacco comments:     social smoker, max close to a pack a day, aver 0.5-0.75 pk/dy   Vaping Use    Vaping status: Never Used   Substance and Sexual Activity    Alcohol use: Yes     Comment: social    Drug use: No    Sexual activity: Yes     Partners: Male     Birth control/protection: Tubal Ligation   Other Topics Concern    Exercise Yes     Comment: treadmill, spin bike     History   Drug Use No     Available pre-op labs reviewed.               Airway      Mallampati: II  Mouth opening: >3 FB  TM distance: 4 - 6 cm  Neck ROM: full Cardiovascular      Rhythm: regular  Rate: normal     Dental    Dentition appears grossly intact         Pulmonary      Breath sounds clear to auscultation bilaterally.               Other findings              ASA: 3   Plan: general  NPO status verified and patient meets guidelines.    Post-procedure pain management plan discussed with surgeon and patient.  Surgeon requests: regional block    Plan/risks discussed with: patient and spouse                Present on Admission:  **None**

## 2024-05-29 ENCOUNTER — HOSPITAL ENCOUNTER (OUTPATIENT)
Facility: HOSPITAL | Age: 59
Setting detail: HOSPITAL OUTPATIENT SURGERY
Discharge: HOME | End: 2024-05-29
Attending: SURGERY | Admitting: SURGERY

## 2024-05-29 ENCOUNTER — ANESTHESIA (OUTPATIENT)
Dept: SURGERY | Facility: HOSPITAL | Age: 59
End: 2024-05-29

## 2024-05-29 VITALS
TEMPERATURE: 98 F | DIASTOLIC BLOOD PRESSURE: 64 MMHG | OXYGEN SATURATION: 96 % | RESPIRATION RATE: 18 BRPM | HEIGHT: 64.75 IN | SYSTOLIC BLOOD PRESSURE: 111 MMHG | WEIGHT: 207.38 LBS | BODY MASS INDEX: 34.98 KG/M2 | HEART RATE: 81 BPM

## 2024-05-29 DIAGNOSIS — C50.911 MALIGNANT NEOPLASM OF RIGHT FEMALE BREAST, UNSPECIFIED ESTROGEN RECEPTOR STATUS, UNSPECIFIED SITE OF BREAST (HCC): Primary | ICD-10-CM

## 2024-05-29 PROCEDURE — 0HTV0ZZ RESECTION OF BILATERAL BREAST, OPEN APPROACH: ICD-10-PCS | Performed by: SURGERY

## 2024-05-29 PROCEDURE — 88342 IMHCHEM/IMCYTCHM 1ST ANTB: CPT | Performed by: SURGERY

## 2024-05-29 PROCEDURE — 88307 TISSUE EXAM BY PATHOLOGIST: CPT | Performed by: SURGERY

## 2024-05-29 PROCEDURE — 88341 IMHCHEM/IMCYTCHM EA ADD ANTB: CPT | Performed by: SURGERY

## 2024-05-29 PROCEDURE — 3E0W3KZ INTRODUCTION OF OTHER DIAGNOSTIC SUBSTANCE INTO LYMPHATICS, PERCUTANEOUS APPROACH: ICD-10-PCS | Performed by: SURGERY

## 2024-05-29 PROCEDURE — 07B50ZX EXCISION OF RIGHT AXILLARY LYMPHATIC, OPEN APPROACH, DIAGNOSTIC: ICD-10-PCS | Performed by: SURGERY

## 2024-05-29 PROCEDURE — 76942 ECHO GUIDE FOR BIOPSY: CPT | Performed by: ANESTHESIOLOGY

## 2024-05-29 PROCEDURE — 0HHV0NZ INSERTION OF TISSUE EXPANDER INTO BILATERAL BREAST, OPEN APPROACH: ICD-10-PCS | Performed by: SURGERY

## 2024-05-29 DEVICE — NATRELLE TE SMOOTH 133S-MX-14-T (US)
Type: IMPLANTABLE DEVICE | Site: BREAST | Status: FUNCTIONAL
Brand: NATRELLE 133S TISSUE EXPANDERS

## 2024-05-29 DEVICE — GRAFT HUM TISS THK2-2.8MM THCK L PERF CNTOUR: Type: IMPLANTABLE DEVICE | Site: BREAST | Status: FUNCTIONAL

## 2024-05-29 RX ORDER — HYDROMORPHONE HYDROCHLORIDE 1 MG/ML
0.6 INJECTION, SOLUTION INTRAMUSCULAR; INTRAVENOUS; SUBCUTANEOUS EVERY 5 MIN PRN
Status: ACTIVE | OUTPATIENT
Start: 2024-05-29 | End: 2024-05-29

## 2024-05-29 RX ORDER — ROCURONIUM BROMIDE 10 MG/ML
INJECTION, SOLUTION INTRAVENOUS AS NEEDED
Status: DISCONTINUED | OUTPATIENT
Start: 2024-05-29 | End: 2024-05-29 | Stop reason: SURG

## 2024-05-29 RX ORDER — LIDOCAINE HYDROCHLORIDE 10 MG/ML
INJECTION, SOLUTION EPIDURAL; INFILTRATION; INTRACAUDAL; PERINEURAL AS NEEDED
Status: DISCONTINUED | OUTPATIENT
Start: 2024-05-29 | End: 2024-05-29 | Stop reason: SURG

## 2024-05-29 RX ORDER — MEPERIDINE HYDROCHLORIDE 25 MG/ML
12.5 INJECTION INTRAMUSCULAR; INTRAVENOUS; SUBCUTANEOUS AS NEEDED
Status: DISCONTINUED | OUTPATIENT
Start: 2024-05-29 | End: 2024-05-29

## 2024-05-29 RX ORDER — DEXAMETHASONE SODIUM PHOSPHATE 4 MG/ML
VIAL (ML) INJECTION AS NEEDED
Status: DISCONTINUED | OUTPATIENT
Start: 2024-05-29 | End: 2024-05-29 | Stop reason: SURG

## 2024-05-29 RX ORDER — HYDROCODONE BITARTRATE AND ACETAMINOPHEN 5; 325 MG/1; MG/1
1 TABLET ORAL ONCE AS NEEDED
Status: COMPLETED | OUTPATIENT
Start: 2024-05-29 | End: 2024-05-29

## 2024-05-29 RX ORDER — ACETAMINOPHEN 500 MG
1000 TABLET ORAL ONCE AS NEEDED
Status: COMPLETED | OUTPATIENT
Start: 2024-05-29 | End: 2024-05-29

## 2024-05-29 RX ORDER — HYDROCODONE BITARTRATE AND ACETAMINOPHEN 5; 325 MG/1; MG/1
1-2 TABLET ORAL EVERY 4 HOURS PRN
Qty: 40 TABLET | Refills: 0 | Status: SHIPPED | OUTPATIENT
Start: 2024-05-29

## 2024-05-29 RX ORDER — CEPHALEXIN 500 MG/1
500 CAPSULE ORAL 4 TIMES DAILY
Qty: 40 CAPSULE | Refills: 1 | Status: SHIPPED | OUTPATIENT
Start: 2024-05-29 | End: 2024-06-18

## 2024-05-29 RX ORDER — ONDANSETRON 2 MG/ML
4 INJECTION INTRAMUSCULAR; INTRAVENOUS EVERY 6 HOURS PRN
Status: DISCONTINUED | OUTPATIENT
Start: 2024-05-29 | End: 2024-05-29

## 2024-05-29 RX ORDER — NEOSTIGMINE METHYLSULFATE 1 MG/ML
INJECTION, SOLUTION INTRAVENOUS AS NEEDED
Status: DISCONTINUED | OUTPATIENT
Start: 2024-05-29 | End: 2024-05-29 | Stop reason: SURG

## 2024-05-29 RX ORDER — SODIUM CHLORIDE, SODIUM LACTATE, POTASSIUM CHLORIDE, CALCIUM CHLORIDE 600; 310; 30; 20 MG/100ML; MG/100ML; MG/100ML; MG/100ML
INJECTION, SOLUTION INTRAVENOUS CONTINUOUS
Status: DISCONTINUED | OUTPATIENT
Start: 2024-05-29 | End: 2024-05-29

## 2024-05-29 RX ORDER — SCOLOPAMINE TRANSDERMAL SYSTEM 1 MG/1
1 PATCH, EXTENDED RELEASE TRANSDERMAL ONCE
Status: DISCONTINUED | OUTPATIENT
Start: 2024-05-29 | End: 2024-05-29 | Stop reason: HOSPADM

## 2024-05-29 RX ORDER — DEXAMETHASONE SODIUM PHOSPHATE 10 MG/ML
INJECTION, SOLUTION INTRAMUSCULAR; INTRAVENOUS AS NEEDED
Status: DISCONTINUED | OUTPATIENT
Start: 2024-05-29 | End: 2024-05-29 | Stop reason: SURG

## 2024-05-29 RX ORDER — HYDROMORPHONE HYDROCHLORIDE 1 MG/ML
0.2 INJECTION, SOLUTION INTRAMUSCULAR; INTRAVENOUS; SUBCUTANEOUS EVERY 5 MIN PRN
Status: ACTIVE | OUTPATIENT
Start: 2024-05-29 | End: 2024-05-29

## 2024-05-29 RX ORDER — BUPIVACAINE HYDROCHLORIDE 2.5 MG/ML
INJECTION, SOLUTION EPIDURAL; INFILTRATION; INTRACAUDAL AS NEEDED
Status: DISCONTINUED | OUTPATIENT
Start: 2024-05-29 | End: 2024-05-29 | Stop reason: SURG

## 2024-05-29 RX ORDER — ONDANSETRON 2 MG/ML
INJECTION INTRAMUSCULAR; INTRAVENOUS AS NEEDED
Status: DISCONTINUED | OUTPATIENT
Start: 2024-05-29 | End: 2024-05-29 | Stop reason: SURG

## 2024-05-29 RX ORDER — GLYCOPYRROLATE 0.2 MG/ML
INJECTION, SOLUTION INTRAMUSCULAR; INTRAVENOUS AS NEEDED
Status: DISCONTINUED | OUTPATIENT
Start: 2024-05-29 | End: 2024-05-29 | Stop reason: SURG

## 2024-05-29 RX ORDER — ISOSULFAN BLUE 50 MG/5ML
INJECTION, SOLUTION SUBCUTANEOUS AS NEEDED
Status: DISCONTINUED | OUTPATIENT
Start: 2024-05-29 | End: 2024-05-29 | Stop reason: HOSPADM

## 2024-05-29 RX ORDER — MIDAZOLAM HYDROCHLORIDE 1 MG/ML
INJECTION INTRAMUSCULAR; INTRAVENOUS AS NEEDED
Status: DISCONTINUED | OUTPATIENT
Start: 2024-05-29 | End: 2024-05-29 | Stop reason: SURG

## 2024-05-29 RX ORDER — HYDROMORPHONE HYDROCHLORIDE 1 MG/ML
0.4 INJECTION, SOLUTION INTRAMUSCULAR; INTRAVENOUS; SUBCUTANEOUS EVERY 5 MIN PRN
Status: ACTIVE | OUTPATIENT
Start: 2024-05-29 | End: 2024-05-29

## 2024-05-29 RX ORDER — PROCHLORPERAZINE EDISYLATE 5 MG/ML
5 INJECTION INTRAMUSCULAR; INTRAVENOUS EVERY 8 HOURS PRN
Status: DISCONTINUED | OUTPATIENT
Start: 2024-05-29 | End: 2024-05-29

## 2024-05-29 RX ORDER — LABETALOL HYDROCHLORIDE 5 MG/ML
5 INJECTION, SOLUTION INTRAVENOUS EVERY 5 MIN PRN
Status: ACTIVE | OUTPATIENT
Start: 2024-05-29 | End: 2024-05-29

## 2024-05-29 RX ORDER — NALOXONE HYDROCHLORIDE 0.4 MG/ML
0.08 INJECTION, SOLUTION INTRAMUSCULAR; INTRAVENOUS; SUBCUTANEOUS AS NEEDED
Status: ACTIVE | OUTPATIENT
Start: 2024-05-29 | End: 2024-05-29

## 2024-05-29 RX ORDER — HYDROCODONE BITARTRATE AND ACETAMINOPHEN 5; 325 MG/1; MG/1
2 TABLET ORAL ONCE AS NEEDED
Status: COMPLETED | OUTPATIENT
Start: 2024-05-29 | End: 2024-05-29

## 2024-05-29 RX ORDER — MIDAZOLAM HYDROCHLORIDE 1 MG/ML
1 INJECTION INTRAMUSCULAR; INTRAVENOUS EVERY 5 MIN PRN
Status: ACTIVE | OUTPATIENT
Start: 2024-05-29 | End: 2024-05-29

## 2024-05-29 RX ORDER — LIDOCAINE HYDROCHLORIDE 10 MG/ML
INJECTION, SOLUTION INFILTRATION; PERINEURAL AS NEEDED
Status: DISCONTINUED | OUTPATIENT
Start: 2024-05-29 | End: 2024-05-29 | Stop reason: HOSPADM

## 2024-05-29 RX ORDER — DOCUSATE SODIUM 100 MG/1
100 CAPSULE, LIQUID FILLED ORAL 2 TIMES DAILY
Qty: 30 CAPSULE | Refills: 1 | Status: SHIPPED | OUTPATIENT
Start: 2024-05-29

## 2024-05-29 RX ORDER — HYDROMORPHONE HYDROCHLORIDE 1 MG/ML
INJECTION, SOLUTION INTRAMUSCULAR; INTRAVENOUS; SUBCUTANEOUS
Status: COMPLETED
Start: 2024-05-29 | End: 2024-05-29

## 2024-05-29 RX ORDER — ONDANSETRON 4 MG/1
4 TABLET, FILM COATED ORAL EVERY 8 HOURS PRN
Qty: 12 TABLET | Refills: 0 | Status: SHIPPED | OUTPATIENT
Start: 2024-05-29

## 2024-05-29 RX ORDER — EPHEDRINE SULFATE 50 MG/ML
INJECTION INTRAVENOUS AS NEEDED
Status: DISCONTINUED | OUTPATIENT
Start: 2024-05-29 | End: 2024-05-29 | Stop reason: SURG

## 2024-05-29 RX ORDER — MULTIVIT-MIN/IRON FUM/FOLIC AC 7.5 MG-4
1 TABLET ORAL DAILY
COMMUNITY

## 2024-05-29 RX ORDER — ACETAMINOPHEN 500 MG
1000 TABLET ORAL ONCE
Status: DISCONTINUED | OUTPATIENT
Start: 2024-05-29 | End: 2024-05-29 | Stop reason: HOSPADM

## 2024-05-29 RX ADMIN — EPHEDRINE SULFATE 10 MG: 50 INJECTION INTRAVENOUS at 09:41:00

## 2024-05-29 RX ADMIN — NEOSTIGMINE METHYLSULFATE 4 MG: 1 INJECTION, SOLUTION INTRAVENOUS at 12:51:00

## 2024-05-29 RX ADMIN — ROCURONIUM BROMIDE 10 MG: 10 INJECTION, SOLUTION INTRAVENOUS at 10:14:00

## 2024-05-29 RX ADMIN — DEXAMETHASONE SODIUM PHOSPHATE 8 MG: 4 MG/ML VIAL (ML) INJECTION at 09:02:00

## 2024-05-29 RX ADMIN — BUPIVACAINE HYDROCHLORIDE 30 ML: 2.5 INJECTION, SOLUTION EPIDURAL; INFILTRATION; INTRACAUDAL at 08:55:00

## 2024-05-29 RX ADMIN — LIDOCAINE HYDROCHLORIDE 30 MG: 10 INJECTION, SOLUTION EPIDURAL; INFILTRATION; INTRACAUDAL; PERINEURAL at 08:50:00

## 2024-05-29 RX ADMIN — BUPIVACAINE HYDROCHLORIDE 30 ML: 2.5 INJECTION, SOLUTION EPIDURAL; INFILTRATION; INTRACAUDAL at 08:58:00

## 2024-05-29 RX ADMIN — EPHEDRINE SULFATE 10 MG: 50 INJECTION INTRAVENOUS at 09:35:00

## 2024-05-29 RX ADMIN — EPHEDRINE SULFATE 10 MG: 50 INJECTION INTRAVENOUS at 09:13:00

## 2024-05-29 RX ADMIN — DEXAMETHASONE SODIUM PHOSPHATE 2 MG: 10 INJECTION, SOLUTION INTRAMUSCULAR; INTRAVENOUS at 08:58:00

## 2024-05-29 RX ADMIN — SODIUM CHLORIDE, SODIUM LACTATE, POTASSIUM CHLORIDE, CALCIUM CHLORIDE: 600; 310; 30; 20 INJECTION, SOLUTION INTRAVENOUS at 13:19:00

## 2024-05-29 RX ADMIN — GLYCOPYRROLATE 0.6 MG: 0.2 INJECTION, SOLUTION INTRAMUSCULAR; INTRAVENOUS at 12:51:00

## 2024-05-29 RX ADMIN — MIDAZOLAM HYDROCHLORIDE 2 MG: 1 INJECTION INTRAMUSCULAR; INTRAVENOUS at 08:45:00

## 2024-05-29 RX ADMIN — SODIUM CHLORIDE, SODIUM LACTATE, POTASSIUM CHLORIDE, CALCIUM CHLORIDE: 600; 310; 30; 20 INJECTION, SOLUTION INTRAVENOUS at 08:45:00

## 2024-05-29 RX ADMIN — EPHEDRINE SULFATE 10 MG: 50 INJECTION INTRAVENOUS at 11:47:00

## 2024-05-29 RX ADMIN — ROCURONIUM BROMIDE 50 MG: 10 INJECTION, SOLUTION INTRAVENOUS at 08:50:00

## 2024-05-29 RX ADMIN — EPHEDRINE SULFATE 10 MG: 50 INJECTION INTRAVENOUS at 11:14:00

## 2024-05-29 RX ADMIN — ONDANSETRON 4 MG: 2 INJECTION INTRAMUSCULAR; INTRAVENOUS at 12:06:00

## 2024-05-29 RX ADMIN — DEXAMETHASONE SODIUM PHOSPHATE 2 MG: 10 INJECTION, SOLUTION INTRAMUSCULAR; INTRAVENOUS at 08:55:00

## 2024-05-29 NOTE — BRIEF OP NOTE
Pre-Operative Diagnosis: MALIGNANT NEOPLASM OF RIGHT BREAST, FEMALE     Post-Operative Diagnosis: MALIGNANT NEOPLASM OF RIGHT BREAST, FEMALE      Procedure Performed:   BILATERAL BREAST TOTAL MASTECTOMY WITH RIGHT SENTINEL LYMPH NODE BIOPSY POSSIBLE RIGHT AXILLARY LYMPH NODE DISSECTION (LINN) Immediate breast reconstruction with placement of bilateral breast tissue expanders and acellular dermal matrix (CHASE)    Surgeons and Role:  Panel 1:     * Thaddeus Trujillo MD - Primary  Panel 2:     * Raciel Jain MD - Primary    Assistant(s):  Marsha Lee  Assistant was medically necessary for patient positioning, suctioning and retraction of tissues     Surgical Findings: see above     Specimen: to pathology     Estimated Blood Loss: 100cc        Thaddeus Trujillo MD  5/29/2024  11:38 AM

## 2024-05-29 NOTE — OPERATIVE REPORT
OPERATIVE REPORT    PREOPERATIVE DIAGNOSIS: Right breast cancer with acquired absence of bilateral breasts.  POSTOPERATIVE DIAGNOSIS: Right breast cancer with acquired absence of bilateral breasts.  PROCEDURE PERFORMED:   Bilateral immediate breast reconstruction with tissue expander and acellular dermal matrix.  ASSISTANT: LANA Gupta  ANESTHESIA: General with endotracheal intubation.  ESTIMATED BLOOD LOSS: 10ml  DRAINS: Charbel Bowman x 4  SPECIMENS: None  COMPLICATIONS: None.     FINDINGS:   Bilateral breasts reconstructed with Natrelle 133S smooth tissue expander, 600ml reference number [id number], on the right serial number [id number], on the left serial number [id number].  The tissue expanders were placed in the partial submuscular position with inferolateral Alloderm acellular dermal matrix sling for soft tissue support.  Tissue expanders were filled intraoperatively with 200 of saline.    INDICATIONS: The patient is a 59-year-old  female with a history of right breast cancer. The patient was evaluated by Dr. Trujillo and elected to undergo bilateral skin-sparing mastectomies. She was referred preoperatively to discuss reconstructive options and opted for immediate reconstruction with tissue expanders and acellular dermal matrix.    PROCEDURE: Informed consent was obtained from the patient. The risks, benefits, and alternatives were reviewed with the patient preoperatively. She expressed understanding and wished to proceed. The patient was marked in the preoperative holding area in the upright position. The midline, inframammary fold, lateral fold of the breasts were marked.  Periareolar incisions were marked in conjunction with Dr. Trujillo.  The patient was then taken to the operating room by Dr. Trujillo's team where she underwent bilateral skin sparing-mastectomies.      Once the mastectomies were completed, I entered the room and the plastic surgery portion of the procedure began.The surgical site was  re-draped sterilely.  The mastectomy skin flaps were examined and appeared of suitable thickness of viability to facilitate immediate reconstruction. The pockets were irrigated with warm saline irrigation until all particulate fat was evacuated. Hemostasis was then secured with electrocautery.  Next, the pockets were irrigated with Betadine irrigation and then with antibiotic irrigation until clear.      The right breast was reconstructed first. A subpectoral pocket was then created with electrocautery. Larger perforators were individually clipped and divided. The inferior origin of the pectoralis muscles were released in the midline. Once an adequate subpectoral pocket had been created, a sheet of large acellular dermal matrix was brought onto the field and soaked in saline. The acellular dermal matrix was then brought to the breast pocket utilizing a no touch technique. The acellular dermal matrix was secured to the inframammary fold and laterally to serratus fascia with 2-0 PDS suture. The pocket was then irrigated with antibiotic irrigation. Gloves were changed. The right-sided tissue expander was then brought onto the field and immediately bathed in antibiotic irrigation. The expander was checked for integrity and noted to be intact. The expander was accessed and all air was evacuated. The expander was then placed in the subpectoral pocket, taking care to maintain its proper orientation. The medial and superior suture tabs were then secured to the chest wall with interrupted 2-0 Vicryl suture. The superior edge of the acellular dermal matrix was then secured to the inferior aspect of the pectoralis muscle with a running 2-0 PDS suture. The pocket was then irrigated with antibiotic irrigation and hemostasis noted to be adequate. Two 15-[place] Ronald drain was exited through a lateral stab incision and sutured to the skin with 3-0 nylon suture. The expander was then accessed with a butterfly needle and filled with  200mL of saline. This permitted tension-free closure of the skin edges. The skin was then closed with interrupted 3-0 Vicryl deep dermal suture and running 4-0 Monocryl subcuticular suture.     Attention was then turned to the left breast. A subpectoral pocket was created with electrocautery. Larger perforators were individually clipped and divided. The inferior origin of the pectoralis muscles were released in the midline. Once an adequate subpectoral pocket had been created, a sheet of large acellular dermal matrix was brought onto the field and soaked in saline. The acellular dermal matrix was then brought to the breast pocket utilizing a no touch technique. The acellular dermal matrix was secured to the inframammary fold and laterally to serratus fascia with 2-0 PDS suture. The pocket was then irrigated with antibiotic irrigation. Gloves were changed. The left-sided tissue expander was then brought onto the field and immediately bathed in antibiotic irrigation. The expander was checked for integrity and noted to be intact. The expander was accessed and all air was evacuated. The expander was then placed in the subpectoral pocket, taking care to maintain its proper orientation. The medial and superior suture tabs were then secured to the chest wall with interrupted 2-0 Vicryl suture. The superior edge of the acellular dermal matrix was then secured to the inferior aspect of the pectoralis muscle with a running 2-0 PDS suture. The pocket was then irrigated with antibiotic irrigation and hemostasis noted to be adequate. Two 15-Czech Ronald drain was exited through a lateral stab incision and sutured to the skin with 3-0 nylon suture. The expander was then accessed with a butterfly needle and filled with 200mL of saline. This permitted tension-free closure of the skin edges. The skin was then closed with interrupted 3-0 Vicryl deep dermal suture and running 4-0 Monocryl subcuticular suture.     The drain sites were  dressed with BioPatch and Tegaderm. The incisions dressed with Exofin and Steri-Strips.  Fluff gauze and a surgical bra were applied.  The patient was awakened, extubated, and taken to the recovery area in stable condition. There were no operative complications. All needle, sponge, and instrument counts were correct at the end of the procedure.

## 2024-05-29 NOTE — PROGRESS NOTES
Plan for SS-mastectomy by Dr. Trujillo and immediate reconstruction with tissue expander and acellular dermal matrix reviewed with patient. The risks of surgery including but not limited to bleeding, infection, scarring, delayed wound healing, seroma, asymmetry, implant infection/extrusion requiring removal, expander deflation, injury to adjacent structures, capsular contracture, ALCL, and need for further surgery were reviewed. The expected post-operative course was discussed. Questions were answered to the patient's satisfaction. No guarantees as to outcome were offered. The patient expresses understanding and wishes to proceed.

## 2024-05-29 NOTE — OPERATIVE REPORT
[facility]    PATIENT'S NAME: BRANDO STOVALL   ATTENDING PHYSICIAN: Thaddeus Trujillo M.D.   OPERATING PHYSICIAN: Thaddeus Trujillo M.D.   PATIENT ACCOUNT#:   [id number]    LOCATION:  [facility] OR [facility] 2 [facility] 10  MEDICAL RECORD #:   [id number]       YOB: 1965  ADMISSION DATE:       05/29/2024      OPERATION DATE:  05/29/2024    OPERATIVE REPORT      PREOPERATIVE DIAGNOSIS:  Right breast cancer, clinically stage II.  POSTOPERATIVE DIAGNOSIS:  Right breast cancer, clinically stage II.  PROCEDURE:  Bilateral total mastectomy and right sentinel lymph node biopsy.    ASSISTANT:  SANIYA Brown.  Assistant was medically necessary for patient positioning, suctioning, and retraction of tissues.     ANESTHESIA:  General.      INDICATIONS:  The patient is a 59-year-old lady diagnosed with right breast cancer.  She presents for mastectomy.  She wishes to proceed with mastectomy bilaterally.  The surgery of bilateral total mastectomy and right lymph node sentinel biopsy, possible right axillary dissection, was discussed with her and her  in detail, as well as potential risks and complications, not limited to infection, bleeding, anesthetic risks, heart attack, stroke, and death.  The possibility of local or distant recurrence was discussed, and the possibility of requiring another surgery based on final pathology was also discussed.  All their questions were answered to their satisfaction.  They are agreeable to proceed.    OPERATIVE TECHNIQUE:  Informed consent was obtained.  She was taken to operating room, SCDs placed, IV antibiotics given.  A Parknison catheter was placed.  The bilateral breasts and axilla were prepped and draped in the usual sterile fashion.  I made an elliptical incision encompassing the nipple-areolar complex on the left side.  Incision made using scalpel and then carried down to the breast parenchyma.  Using electrocautery to dissect, the mastectomy flaps were  created to the clavicle, sternum, inframammary fold, and latissimus muscle.  I dissected the breast off the pectoralis major muscle, taking care to take the entire pectoralis fascia along with it.  In this fashion, the specimen was removed in 1 piece and tagged in the following manner:  Long stitch axillary tail, short stitch 12 o'clock areola.  The wound was irrigated, suctioned dry, and examined for bleeding.  Hemostasis was maintained using electrocautery.    I injected 5 mL of Lymphazurin blue dye in the right breast subareolar area.  Breast massage was carried out for 5 minutes.  I made a curvilinear incision in the right axilla.  Incision made using scalpel and carried down through the clavipectoral fascia.  The sentinel lymph nodes were identified and sent to Pathology for routine analysis.  They did not feel abnormal.  I examined the axilla for any further hot, palpable, or blue lymph nodes, and there were none noted.  I turned my attention towards the right breast.  I made an elliptical incision encompassing the nipple-areolar complex  and skin on the right side.  Incision made using scalpel and then carried down to the breast parenchyma.  Using electrocautery, the mastectomy flaps were created to the clavicle, sternum, inframammary fold, and latissimus muscle.  I dissected the breast off the pectoralis major muscle, taking care to take the entire pectoralis fascia along with it.  In this fashion, the specimen was removed in 1 piece and tagged in the following manner:  Long stitch axillary tail, short stitch 12 o'clock areola.  The wound was irrigated, suctioned dry, and examined for bleeding.  Hemostasis was meticulously maintained using electrocautery.  I examined the mastectomy flaps.  They were noted to be of even and appropriate thickness.  At the end of my procedure, the sponge, instrument, and needle counts were correct.  The patient tolerated the procedure well.   mL.  Complications none.   Dr. Jain scrubbed in to perform reconstruction, and I discussed my findings with the patient's family who accompanied her.    Dictated By Thaddeus Trujillo M.D.  d: 05/29/2024 11:42:01  t: 05/29/2024 12:08:27  [facility] [id number]  /

## 2024-05-29 NOTE — ANESTHESIA POSTPROCEDURE EVALUATION
[facility] Patient Status:  Hospital Outpatient Surgery   Age/Gender 59 year old female MRN [id number]   Location [facility] POST ANESTHESIA CARE UNIT Attending Thaddeus Trujillo MD   Hosp Day # 0 PCP Lainey Bustillo DO       Anesthesia Post-op Note    BILATERAL BREAST TOTAL MASTECTOMY WITH RIGHT SENTINEL LYMPH NODE BIOPSY (LINN)    Procedure Summary       Date: 05/29/24 Room / Location:  MAIN OR 03 / [facility] MAIN OR    Anesthesia Start: 0845 Anesthesia Stop: 1319    Procedures:       BILATERAL BREAST TOTAL MASTECTOMY WITH RIGHT SENTINEL LYMPH NODE BIOPSY (LINN) (Bilateral: Breast)      Immediate breast reconstruction with placement of bilateral breast tissue expanders and acellular dermal matrix (CHASE) (Bilateral: Breast) Diagnosis: (MALIGNANT NEOPLASM OF RIGHT BREAST, FEMALE)    Surgeons: Thaddeus Trujillo MD; Raciel Jain MD Anesthesiologist: Brett Abad MD    Anesthesia Type: general ASA Status: 3            Anesthesia Type: general    Vitals Value Taken Time   /59 05/29/24 1330   Temp 98.2 05/29/24 1335   Pulse 88 05/29/24 1335   Resp 15 05/29/24 1335   SpO2 96 % 05/29/24 1335   Vitals shown include unfiled device data.    Patient Location: PACU    Anesthesia Type: general    Airway Patency: extubated    Postop Pain Control: adequate    Mental Status: mildly sedated but able to meaningfully participate in the post-anesthesia evaluation    Nausea/Vomiting: none    Cardiopulmonary/Hydration status: stable euvolemic    Complications: no apparent anesthesia related complications    Postop vital signs: stable    Dental Exam: Unchanged from Preop

## 2024-05-29 NOTE — H&P
[facility]   part of [facility]    History and Physical    Nathalie Reed Patient Status:  Hospital Outpatient Surgery    1965 MRN [id number]   Location [facility] PERIOPERATIVE SERVICE Attending Thaddeus Trujillo MD   Hosp Day # 0 PCP Lainey Bustillo DO     Date:  2024  Date of Admission:  2024    History provided by:patient  HPI:   No chief complaint on file.    HPI    History     Past Medical History:    ASTHMA    Asthma (HCC)    Atherosclerosis of coronary artery    Cancer (HCC)    Basal Cell Carcinoma    Cancer of breast (HCC)    RIGHT BREAST    Coronary atherosclerosis    HEART DISEASE    2v CABG    High cholesterol    Hx of motion sickness    Osteoarthritis    Other and unspecified hyperlipidemia     Past Surgical History:   Procedure Laterality Date    Cabg      cabg x 2    Hysteroscopy      myomectomy    Other surgical history      LSC ectopic    Other surgical history  12    Prep of wound to right cheek adjacent tissue rearrangment to right cheek GSH/    Skin surgery  12    BCC nod / R inf. eyelid / Mohs surgery by Dr. Reed    Tubal ligation       Family History   Problem Relation Age of Onset    Other (Other) Father         low hdl    Cancer Mother         leukemia    Heart Disorder Maternal Grandfather 50     Social History:  Social History     Socioeconomic History    Marital status:    Occupational History    Occupation: claim adjustor transciptionist   Tobacco Use    Smoking status: Former     Current packs/day: 0.00     Average packs/day: 0.7 packs/day for 28.0 years (21.0 ttl pk-yrs)     Types: Cigarettes     Start date:      Quit date: 2006     Years since quittin.9    Smokeless tobacco: Never    Tobacco comments:     social smoker, max close to a pack a day, aver 0.5-0.75 pk/dy   Vaping Use    Vaping status: Never Used   Substance and Sexual Activity    Alcohol use: Yes     Comment: social    Drug use: No    Sexual  activity: Yes     Partners: Male     Birth control/protection: Tubal Ligation   Other Topics Concern    Exercise Yes     Comment: treadmill, spin bike     Allergies/Medications:   Allergies:   Allergies   Allergen Reactions    Lipitor [Atorvastatin Calcium] RASH    Niacin FACE FLUSHING     Medications Prior to Admission   Medication Sig    Multiple Vitamins-Minerals (MULTI-VITAMIN/MINERALS) Oral Tab Take 1 tablet by mouth daily.    XELJANZ XR 11 MG Oral Tablet 24 Hr Take 1 tablet by mouth daily.    Tretinoin 0.05 % External Cream Apply a pea size amount to entire face nightly as tolerated    ZOLPIDEM 5 MG Oral Tab TAKE  1 TABLET BY MOUTH   AT BEDTIME  AS  NEEDED  FOR SLEEP    Cholecalciferol (VITAMIN D3) 50 MCG (2000 UT) Oral Chew Tab     VENLAFAXINE 150 MG Oral Capsule SR 24 Hr TAKE ONE CAPSULE BY MOUTH ONE TIME DAILY    rosuvastatin (CRESTOR) 20 MG Oral Tab Take 1 tablet (20 mg total) by mouth daily.    Vitamin B-12 1000 MCG Oral Tab Take 1 tablet (1,000 mcg total) by mouth daily.    aspirin 81 MG Oral Tab Take 1 tablet (81 mg total) by mouth daily.    Clindamycin Phos-Benzoyl Perox 1.2-5 % External Gel Apply 1 Application topically every morning.    Fluocinonide 0.05 % External Solution Use on scalp BID x 1-2 weeks, then taper to prn (Patient not taking: Reported on 2/3/2024)    Clobetasol Propionate 0.05 % External Ointment Apply to AA on hands and ears BID x1-2 week, then taper to prn.       Review of Systems:   Review of Systems    Physical Exam:   Vital Signs:  Blood pressure 128/72, pulse 70, temperature 97.4 °F (36.3 °C), temperature source Temporal, resp. rate 16, height 5' 4.75\" (1.645 m), weight 207 lb 6.4 oz (94.1 kg), last menstrual period 08/11/2017, SpO2 100%, not currently breastfeeding.  Physical Exam  Cervical Papanicolaou to be done in MD's office    Results:     Lab Results   Component Value Date    WBC 7.8 01/26/2024    HGB 14.1 01/26/2024    HCT 41.4 01/26/2024    .0 01/26/2024     CREATSERUM 0.80 2024    BUN 18 2021     2021    K 4.0 2021     2021    CO2 30.0 2021    GLU 96 2021    CA 9.2 2021    ALB 4.6 2024    ALKPHO 145 (H) 2021    BILT 0.39 2021    TP 7.0 2021    AST 62 (H) 2024    ALT 91 (H) 2024    T4F 1.07 10/26/2020    TSH 2.880 2021    GGT 38 2017    ESRML 44 (H) 2024    CRP <0.40 2024    B12 674 2021     NM SENTINEL NODE/LYMPHATIC  (CPT=78195)    Result Date: 2024  CONCLUSION:  See above.   LOCATION:  [facility]   Dictated by (CST): Sofia Nguyen MD on 2024 at 4:56 PM     Finalized by (CST): Sofia Nguyen MD on 2024 at 4:57 PM            Assessment/Plan:     HISTORY OF PRESENT ILLNESS: Ms. Reed is a 58 year old woman who presents with a screen detected right breast cancer . She denies any palpable masses, nipple discharge, skin changes, or axillary adenopathy. She denies breast pain. She does not have any significant past history for breast diseases. She has had a breast biopsy.    Breast cancer risk factors: She achieved menarche at age 17 and is postmenopausal. She is a  3, para 2 and had her first full term pregnancy at the age of 29. She has a cumulative nursing history of few months. She did use oral contraceptives for approximately some years. She did not use hormone replacement therapy. She denies any infertility treatment to achieve pregnancy. She does not have a family history significant for reproductive maligancies. There is no family history of breast or ovarian cancer.    She is here to see if she is a candidate for NSM    Allergies:    Niacin FACE FLUSHING  Lipitor [Atorvastat* RASH   Current Meds:  Current Outpatient Medications   Medication Sig Dispense Refill   ROSUVASTATIN 20 MG Oral Tab TAKE ONE TABLET BY MOUTH ONE TIME DAILY 100 tablet 0   XELJANZ XR 11 MG Oral Tablet 24 Hr   venlafaxine  MG Oral Capsule SR  24 Hr Take 1 capsule (150 mg total) by mouth daily. 90 capsule 3   ibuprofen 400 MG Oral Tab Take 400 mg by mouth every 6 (six) hours as needed for Pain.   zolpidem 5 MG Oral Tab Take 1 tablet (5 mg total) by mouth nightly as needed for Sleep. 30 tablet 5   Clindamycin Phos-Benzoyl Perox (DUAC) 1.2-5 % External Gel Apply to acne prone areas qam 45 g PRN   Tretinoin 0.05 % External Cream Apply a pea size amount to entire face nightly as tolerated 45 g PRN   aspirin 81 MG Oral Tab Take 81 mg by mouth daily.       ROS:   Denies headaches, visual changes, chest pain, palpitations, cough, shortness of breath, abdominal pain, nausea, change in bowel habits, rectal bleeding, bone pain, back pain, hematuria, dysuria, urinary frequency, mood swings, depression.    PHYSICAL EXAM:   The patient is alert, oriented, well-nourished, well-developed woman who appears her stated age. On her HEENT examination, Normocephalic/atraumatic, neck supple, no lymphadenopathy, thyroid non-tender and without palpable masses/nodules or enlargement with clear conjunctiva, non-icteric.  On examination of her heart and lungs RRR, normal S1, S2 auscultated, no murmurs, no JVD and Clear breath sounds, no wheezes, no rales. Her abdomen is soft, non-tender, bowel sounds normal, no masses. Her bilateral lower extremities shows no deformity, cyanosis, or significant edema. Both nipples are everted without discharge. There are no dominant masses or significant focal nodularity in either breast. There are no skin changes on either side. The left axilla shows no adenopathy. The right axilla shows no adenopathy.    Distance from sternal notch to nipple is 30 cm    RADIOLOGIC WORK-UP: radiology films and reports were reviewed.     BILATERAL MAMMOGRAPHY:     Narrative & Impression   DATE OF SERVICE: 01.29.2024  BILATERAL SCREENING MAMMOGRAM WITH CAD WITH TOMOSYNTHESIS  -------------------------------------------------------  DICTATING PHYSICIAN: [place]  BHARAT Obrien    CLINICAL INDICATION: Screening mammogram.    AGE: 58 years.     COMPARISON: Mammography 10/10/2022, 10/4/2021, 9/28/2020, 3/28/2019.    TECHNIQUE: Bilateral screening digital mammographic views with tomosynthesis. Images were checked  with the [employer] CAD system.   -------------------------------------------------------  FINDINGS:   BREAST DENSITY: A-The breasts are predominantly fatty.     There are calcifications within the central middle depth right breast.    In the left breast no suspicious masses, calcifications, or other significant findings.     -------------------------------------------------------  IMPRESSION AND RECOMMENDATION:     1. Right breast calcifications. Recommend right diagnostic mammogram.    2. There is no mammographic evidence of malignancy in the left breast.     BI-RADS Final Assessment Category 0: Incomplete-Need Additional Imaging Evaluation  Management Recommendation: Recall for additional imaging.    THE DEPARTMENT OF RADIOLOGY WILL CONTACT THE PATIENT FOR THE ADDITIONAL EXAMS.   -------------------------------------------------------  -------------------------------------------------------  BREAST CANCER RISK ASSESSMENT SUMMARY:  A quantitative breast cancer risk assessment was performed from patient provided data. The patient?s  density adjusted estimated lifetime risk of breast cancer is below the high-risk threshold.         Narrative & Impression   DATE OF SERVICE: 03.06.2024  RIGHT DIAGNOSTIC MAMMOGRAM WITH CAD AND TOMOSYNTHESIS    CLINICAL INDICATION: Breast calcification, right.     COMPARISON: All prior mammograms dating back to 2021    TECHNIQUE: Unilateral diagnostic digital mammographic views. Images were checked with the [employer] CAD  system. Tomosynthesis 1mm slices obtained.  ----------------------------------------------------------------------------------------  ----------------------------------------    FINDINGS: Breast Density A-The breast is predominantly  fatty. Right breast anterior middle one  third aspect approximately 5:00 position 4 cm region of segmental pleomorphic microcalcifications.    ----------------------------------------------------------------------------------------  ----------------------------------------  IMPRESSION AND RECOMMENDATION:    Segmental distribution of pleomorphic microcalcifications. Recommend further evaluation with  stereotactic guided core needle biopsy.    Patient was informed of the examination results and recommendations upon completion of the study.      ANY FURTHER EVALUATION SHOULD BE BASED ON CLINICAL ASSESSMENT.    The Department of Radiology has informed the patient of their mammogram results by letter or by My  Chart and will contact the patient for needed follow up studies.  ----------------------------------------------------------------------------------------  ----------------------------------------      BI-RADS Final Assessment Category 4: Suspicious.         DATE OF SERVICE: 03.13.2024  PATHOLOGY ADDENDUM     Addendum, 3/19/2024    PATHOLOGY:  Final pathology of right breast stereotactic guided core needle biopsy states invasive ductal  carcinoma. Ductal carcinoma in situ is present. This is concordant with imaging findings.    RECOMMENDATION:  Recommend breast surgical consultation for definitive therapy. Given the extent of  microcalcifications and invasive ductal carcinoma, recommend evaluation of extent of disease with  breast MRI. In the setting of excision and breast conservation therapy recommend bracketed  localization for excision of the entire extent of microcalcifications. The patient has been notified  of the results and recommendations by myself on 3/19/2024 at 1220 hours    As clinically required this can be localized under Stereotactic guidance.    PATHOLOGY ADDENDUM ENDS   Addended by Aida Dee MD on 3/19/2024 12:23 PM     Study Result    Narrative   DATE OF SERVICE:  03.13.2024  PROCEDURES:  1. BIOPSY OF THE RIGHT BREAST, PERCUTANEOUS, VACUUM ASSISTED BIOPSY DEVICE, USING IMAGING GUIDANCE.  2. UPRIGHT STEREOTACTIC GUIDANCE FOR BIOPSY, IMAGING SUPERVISION AND INTERPRETATION.  3. RADIOLOGIC EXAMINATION, SURGICAL SPECIMEN.  4. IMAGE GUIDED PLACEMENT, METALLIC LOCALIZATION CLIP, PERCUTANEOUS, DURING BREAST BIOPSY.  5. POST CLIP RIGHT DIGITAL DIAGNOSTIC MAMMOGRAM    DATE: 3/13/2024 1:59 PM    INDICATION: Suspicious right breast calcifications for stereotactic guided biopsy is being performed    PHYSICIAN: [place] Obrien D.O.    DESCRIPTION: Written, informed consent was obtained. A time out was performed to verify correct  patient and procedure.     Target: Microcalcification.  Location: Overlapping lower inner lower outer quadrant right breast approximately 6:00 position.  Approach: From below  Anesthesia: Local lidocaine for skin and lidocaine with epinephrine deep.  Needle: 9 G Eviva..  Sample number: 6.  Specimen: Targeted microcalcifications are demonstrated within the tissue specimen on specimen  radiograph.  Marker: Famigo infinity marker clip.  Marker migration on post-procedure mammogram: None.  Specimen was sent to surgical pathology.    POST-PROCEDURE DIGITAL MAMMOGRAM:  The patient was taken to the mammography suite for post-procedure mammographic views. These images  demonstrated the biopsy marking clip at the biopsy site without evidence for clip migration. Please  note that the biopsy marker clip is located centrally within the distribution of calcifications.  The patient tolerated the procedure well. There were no immediate complications. The patient  tolerated the procedure without complication and was given verbal and written instructions regarding  post-procedure wound care.   Impression   IMPRESSION:   1. Successful right breast stereotactic, vacuum assisted core needle biopsy.  2. Successful marker clip placement with confirmation by right digital diagnostic  mammogram.  3. See addendum for radiology pathology correlation.    BI-RADS Final Assessment Category: Post Procedure Mammograms for Marker Placement.  Management Recommendation: Assess radiologic/pathologic concordance.                 PATHOLOGY:     Right breast, 6 o’clock, with calcifications, stereotactic needle core biopsy:  -Invasive ductal carcinoma.   -Grade 2 (Tubules: 3, Nuclei: 2, Mitoses: 2).   -Largest focus of tumor measures 2.5 mm (0.25 cm); present in 10% of submitted tissue.  -Ductal carcinoma in situ (DCIS).   -Nuclear grade 2-3, solid type.   -Associated necrosis and microcalcifications.        MRI  DATE OF SERVICE: 03.21.2024  EXAMINATION:   1. MRI of the breasts with and without contrast.  2. Post processing including computer algorithm analysis of MRI image data for lesion  detection/characterization, pharmacokinetic analysis, with further physician review for  interpretation of breast MRI.    COMPARISON: Breast imaging dated 3/13/2024, 3/6/2024, 1/29/2024    HISTORY: Newly diagnosed RIGHT breast invasive ductal carcinoma/ductal carcinoma in situ     TECHNIQUE: Bilateral breast MRI was performed with a dedicated breast coil. Axial T1 non  fat-saturation, and axial T2 fat saturation images were obtained. 3D T1 sequences with fat  saturation were obtained in the axial plane before and after dynamic administration of intravenous  Gadavist. Post-processing images include post-contrast subtraction, MIP and MPR images. Kinetic  time-signal intensity flow curves were also analyzed using [employer] software.    Vial size: 10 mL   Injected amount: 10 mL   Discarded amount: 0 mL     FINDINGS:  Amount of Fibroglandular Tissue: Almost entirely fat.  Background Parenchymal Enhancement: Minimal      RIGHT BREAST:   There is segmental nonmass enhancement spanning 4.7 cm within the anterior to middle third lower  central RIGHT breast. Clip susceptibility artifact noted centrally within the area of  enhancement.  There are of enhancement corresponds to the site of biopsy-proven malignancy and approximates the  extent of calcifications noted mammographically.    The anterior extent of enhancement is 2.1 cm from the nipple base.   Posterior extent of enhancement is 4.5 cm from the chest wall.  Enhancement is approximately 2 cm from the inferior skin margin.    No other suspicious mass or nonmass enhancement. No suspicious axillary or internal mammary  lymphadenopathy.    LEFT BREAST:  No suspicious mass or nonmass enhancement. No suspicious axillary or internal mammary  lymphadenopathy.    OTHER:   None      IMPRESSION AND RECOMMENDATION:  1. Segmental enhancement spanning 4.7 cm within the lower central RIGHT breast corresponds the site  of biopsy-proven malignancy.   The segmental enhancement approximates the extent of calcifications which was approximately 4 cm.   If breast conservation is being performed, then bracketed wire localization of the calcifications is  recommended.    2. No MRI evidence of malignancy in the LEFT breast    3. No suspicious axillary lymphadenopathy      BI-RADS Final Assessment Category 6: Known Biopsy-Proven Malignancy  Management Recommendation: Surgical excision when clinically appropriate.        ASSESSMENT/ PLAN:   Right breast cancer  She is not a candidate for NSM due to ptosis - distance from sternal notch to nipple is 30 cm  She wishes to proceed with bilateral mastectomy.    She is seeing cardiology on 4/11  Plastic surgeon - Dr North/Susy    The procedure of BILATERAL TOTAl MASTECTOMY , RIGHT sentinel lymph node biopsy, possible RIGHT axillary lymph node dissection was discussed with her in detail as well as potential risks and complications which are not limited to infection, bleeding, injury to nerves and blood vessels of the axillary region, heart attack, stroke and death. The possibility of the sentinel lymph node(s) being negative on the day of surgery and being  positive on the final pathology a few days later was also discussed. In this situation we would discuss the possibility that she would need to go back for axillary lymph node dissection on another day. The other possibility of having to go back to surgery to achieve clear margins on the lumpectomy was also explained. I discussed the technique of sentinel lymph node biopsy with injection of technetium labeled sulfur colloid and blue dye.     All her questions were answered to her satisfaction and she expresses understanding of all of the above.              Thaddeus Trujillo MD  5/29/2024

## 2024-05-29 NOTE — ANESTHESIA PROCEDURE NOTES
Airway  Date/Time: 5/29/2024 8:51 AM  Urgency: elective    Airway not difficult    General Information and Staff    Patient location during procedure: OR  Anesthesiologist: Brett Abad MD  Performed: anesthesiologist   Performed by: Brett Abad MD  Authorized by: Brett Abad MD      Indications and Patient Condition  Indications for airway management: anesthesia  Sedation level: deep  Preoxygenated: yes  Patient position: sniffing  Mask difficulty assessment: 1 - vent by mask    Final Airway Details  Final airway type: endotracheal airway      Successful airway: ETT  Cuffed: yes   Successful intubation technique: direct laryngoscopy  Endotracheal tube insertion site: oral  Blade: Earlene  Blade size: #3  ETT size (mm): 7.5    Cormack-Lehane Classification: grade IIA - partial view of glottis  Placement verified by: capnometry   Measured from: teeth  ETT to teeth (cm): 20  Number of attempts at approach: 1

## 2024-05-29 NOTE — DISCHARGE INSTRUCTIONS
Plastic Surgery Home Care Instructions      We hope you were pleased with your care at [facility].  We wish you the best outcome and overall experience with your operation.  These instructions will help to minimize pain, optimize healing, and improve the likelihood of a successful result.    What To Expect  There will be some spotting of the incision lines for the next few days.  Your breasts will be swollen and might feel congested for the next 1-2 weeks  Please DO NOT apply heat or ice to the affected area  Temporary areas of numbness are typical in the early weeks following a breast procedure.  Normalization of sensation can typically take up to several months following the operation.    Bandages (Dressing)  Keep dressings clean and dry  Do not remove your bra unless for hygiene purposes - compression is key - especially at night. You can change to a supportive sports bra or camilsole if this provides good compression and you are more comfortable in that rather than the surgical bra. No underwire.   You are to wear your bra 24/7 for 6 weeks post-operatively.   Reinforce the dressing with insertion of additional padding as needed - this is not required - for your comfort only  You can change the drain dressings if you need to (if they get wet). You will be given extra supplies from the hospital.     Drain Care  Proper drain care is an important part of your home care and will help to prevent fluid collection, minimize the risk for infection, and increase the success of your breast reconstruction.  Empty your drains at 8 am and 8 pm each day  DO NOT GET DRAIN SITES WET  Record each drain separately and use the drain sheet given to you to record the drainage. Follow the instructions on the drain sheet.  Wash your hands before and after emptying your drains  Bring drain sheet with you to your first office visit    Bathing/Showers  You will needs to sponge bathe until your drains are removed. You may shower, but  only from the waist down. Before shower, take out all dressings from bra. Reinforce drain sites with additional waterproof dressings if needed. These will be given to you by the hospital. If some water gets underneath the dressing during the shower, just replace with a new dry waterproof dressing.   DO NOT GET DRAIN SITES WET  No baths, swimming, or hot tubs until you receive medical permission    Pain Medication: Norco  Take one or two tablets every six hours as needed for pain.  Do not take narcotics, if you do not have pain.  Keep stools soft.  Take a stool softener (Metamucil, Milk of Magnesia, Colace).  Eating fruit will also help to prevent constipation    Antibiotics: Keflex 4x/day until your drains come out  Antibiotics will help minimize the risk of a wound infection  Please note the refills on this prescription. If your drains are kept in after you finish the first course of the antibiotic, you need to continue refilling this until your drains are completely removed.   Fill the prescription as directed   Follow the instructions as written on the bottle's label  Call our office if you experience nausea, rash, or other symptoms which might be a possible side effect.    Over-The-Counter Medication  Non-prescription anti-inflammatory medications can also help to ease the pain.  You can take Aleve or ibuprofen starting 72 hours after surgery  Take as directed on the bottle  Drink a full glass of water with the medication    Home Medication  Resume your home medications as instructed  Do not resume herbal medications for two weeks    Diet  Resume your normal diet    Activity  No strenuous activity or heavy lifting (no lifting over 10 pounds)  No activities that involve your pectoralis muscles (no planks, push-ups, etc)  You can go up and down the stairs as tolerated.   You cannot return to work, if your work requires strenuous activity.  You cannot return to physical exercise, sports, or gym workouts until you  are allowed to participate in strenuous activity.    Driving  Do not drive, if you are taking pain medication.    Return to Work or School  You can return to work when you are not taking pain medication, if your work does not involve strenuous activity.  Contact the office, if you need a medical note.     Follow-up Appointment   Our office will call you to set up a time  Call the office for an appointment in two days if you cannot remember the appointment details.    Verify your appointment date, day, time, and location.  At your 1st postoperative office visit:  Your drains will be examined, wounds will be evaluated, healing assessed, and any additional concerns and instructions will be discussed.    Questions or Concerns  Call Dr. Jain's office if you experience sudden swelling of the breast or purple/red/black discoloration of the breast.     Nathalie   [facility]  The nurses and the anesthesiologist try very hard to make sure you receive the best care possible.  Your trust in them is greatly appreciated.    Thanks so much,  Dr. Susy Urena, FNP-ZOYA Lynch RN

## 2024-05-30 ENCOUNTER — TELEPHONE (OUTPATIENT)
Dept: SURGERY | Facility: CLINIC | Age: 59
End: 2024-05-30

## 2024-05-30 NOTE — TELEPHONE ENCOUNTER
Pt called LVM, called her back, pt stated if she can take a probiotic, and take Aleve after 72hrs post surgery, informed pt that I will reach out to Dr. Jain to further advise, called pt after Dr. Jain confirm that she can take probiotic, and do Advil and to follow the bottle instruction.Pt was very appreciative of the call back. advise pt to call our office if she has more questions.

## 2024-06-07 ENCOUNTER — OFFICE VISIT (OUTPATIENT)
Dept: SURGERY | Facility: CLINIC | Age: 59
End: 2024-06-07
Payer: COMMERCIAL

## 2024-06-07 DIAGNOSIS — Z90.13 ABSENCE OF BREAST, BILATERAL: Primary | ICD-10-CM

## 2024-06-07 PROCEDURE — 99024 POSTOP FOLLOW-UP VISIT: CPT

## 2024-06-07 NOTE — PROGRESS NOTES
Nathalie Reed is a 59 year old female who presents today for a follow-up after bilateral total mastectomy and right sentinel lymph node biopsy (Dr. Trujillo) and  bilateral immediate breast reconstruction with tissue expander (Natrelle 133S smooth tissue expander, 600ml with 200 mL of intra-operative saline) and acellular dermal matrix (Dr. Jain) on 5/29/2024.    She denies fever and chills. She denies nausea, vomiting, diarrhea or constipation.   Her pain is controlled.      Physical Exam     Breasts: Breast incisions are clean, dry, intact.  There is no evidence of hematoma or seroma bilaterally.  The bilateral mastectomy skin is without erythema.  Bilateral breast drain sites are clean, dry, intact.  Serosanguineous fluid is present    There were no vitals filed for this visit.      Assessment and Plan     Nathalie Reed is doing well s/p bilateral total mastectomy and right sentinel lymph node biopsy (Dr. Trujillo) and  bilateral immediate breast reconstruction with tissue expander (Natrelle 133S smooth tissue expander, 600ml with 200 mL of intra-operative saline) and acellular dermal matrix (Dr. Jain) on 5/29/2024.    Patient is here today for wound check and drain removal.  1 drain from each breast was removed today due to low volume output.  A dressing of Neosporin, 2 x 2, Tegaderm was placed.  1 drain was left in each breast today due to high volume output.  These drains were redressed with a new Biopatch and Tegaderm.  Drain care and parameters were reviewed with the patient and her daughter.  She will continue to take her oral antibiotics and shower from the waist down until all drains are removed.  Patient and her daughter were instructed to contact the office when her final drains are ready for removal.    New Steri-Strips were used on the bilateral breast incisions.  New ABD padding was placed into the patient's bra per her comfort.    Compression and activity guidelines reviewed  with the patient and her daughter.  She is following up with Dr. Jain on 7-2.  She was encouraged to contact the office with any questions or concerns.    Questions were answered. Patient understands.     EVA Gupta  6/7/2024  9:15 AM

## 2024-06-11 ENCOUNTER — TELEPHONE (OUTPATIENT)
Dept: SURGERY | Facility: CLINIC | Age: 59
End: 2024-06-11

## 2024-06-11 ENCOUNTER — LAB ENCOUNTER (OUTPATIENT)
Dept: LAB | Facility: HOSPITAL | Age: 59
End: 2024-06-11
Attending: INTERNAL MEDICINE
Payer: COMMERCIAL

## 2024-06-11 ENCOUNTER — OFFICE VISIT (OUTPATIENT)
Dept: SURGERY | Facility: CLINIC | Age: 59
End: 2024-06-11
Payer: COMMERCIAL

## 2024-06-11 DIAGNOSIS — Z90.13 ABSENCE OF BREAST, BILATERAL: Primary | ICD-10-CM

## 2024-06-11 DIAGNOSIS — Z51.81 MEDICATION MONITORING ENCOUNTER: ICD-10-CM

## 2024-06-11 DIAGNOSIS — R79.89 ELEVATED LFTS: ICD-10-CM

## 2024-06-11 DIAGNOSIS — M05.79 RHEUMATOID ARTHRITIS INVOLVING MULTIPLE SITES WITH POSITIVE RHEUMATOID FACTOR (HCC): ICD-10-CM

## 2024-06-11 LAB
ALBUMIN SERPL-MCNC: 4.5 G/DL (ref 3.2–4.8)
ALT SERPL-CCNC: 272 U/L
AST SERPL-CCNC: 140 U/L (ref ?–34)
BASOPHILS # BLD AUTO: 0.07 X10(3) UL (ref 0–0.2)
BASOPHILS NFR BLD AUTO: 0.9 %
CREAT BLD-MCNC: 0.71 MG/DL
CRP SERPL-MCNC: 1.1 MG/DL (ref ?–1)
DEPRECATED RDW RBC AUTO: 43.5 FL (ref 35.1–46.3)
EGFRCR SERPLBLD CKD-EPI 2021: 98 ML/MIN/1.73M2 (ref 60–?)
EOSINOPHIL # BLD AUTO: 0.44 X10(3) UL (ref 0–0.7)
EOSINOPHIL NFR BLD AUTO: 5.7 %
ERYTHROCYTE [DISTWIDTH] IN BLOOD BY AUTOMATED COUNT: 13.5 % (ref 11–15)
ERYTHROCYTE [SEDIMENTATION RATE] IN BLOOD: 82 MM/HR
HCT VFR BLD AUTO: 39.6 %
HGB BLD-MCNC: 13.6 G/DL
IMM GRANULOCYTES # BLD AUTO: 0.03 X10(3) UL (ref 0–1)
IMM GRANULOCYTES NFR BLD: 0.4 %
LYMPHOCYTES # BLD AUTO: 1.4 X10(3) UL (ref 1–4)
LYMPHOCYTES NFR BLD AUTO: 18.2 %
MCH RBC QN AUTO: 30.2 PG (ref 26–34)
MCHC RBC AUTO-ENTMCNC: 34.3 G/DL (ref 31–37)
MCV RBC AUTO: 87.8 FL
MONOCYTES # BLD AUTO: 0.54 X10(3) UL (ref 0.1–1)
MONOCYTES NFR BLD AUTO: 7 %
NEUTROPHILS # BLD AUTO: 5.22 X10 (3) UL (ref 1.5–7.7)
NEUTROPHILS # BLD AUTO: 5.22 X10(3) UL (ref 1.5–7.7)
NEUTROPHILS NFR BLD AUTO: 67.8 %
PLATELET # BLD AUTO: 324 10(3)UL (ref 150–450)
RBC # BLD AUTO: 4.51 X10(6)UL
WBC # BLD AUTO: 7.7 X10(3) UL (ref 4–11)

## 2024-06-11 PROCEDURE — 85652 RBC SED RATE AUTOMATED: CPT

## 2024-06-11 PROCEDURE — 85025 COMPLETE CBC W/AUTO DIFF WBC: CPT

## 2024-06-11 PROCEDURE — 82040 ASSAY OF SERUM ALBUMIN: CPT

## 2024-06-11 PROCEDURE — 84450 TRANSFERASE (AST) (SGOT): CPT

## 2024-06-11 PROCEDURE — 86140 C-REACTIVE PROTEIN: CPT

## 2024-06-11 PROCEDURE — 82565 ASSAY OF CREATININE: CPT

## 2024-06-11 PROCEDURE — 36415 COLL VENOUS BLD VENIPUNCTURE: CPT

## 2024-06-11 PROCEDURE — 99024 POSTOP FOLLOW-UP VISIT: CPT

## 2024-06-11 PROCEDURE — 84460 ALANINE AMINO (ALT) (SGPT): CPT

## 2024-06-11 NOTE — PROGRESS NOTES
Nathalie Reed is a 59 year old female who presents today for a follow-up after bilateral total mastectomy and right sentinel lymph node biopsy (Dr. Trujillo) and  bilateral immediate breast reconstruction with tissue expander (Natrelle 133S smooth tissue expander, 600ml with 200 mL of intra-operative saline) and acellular dermal matrix (Dr. Jain) on 5/29/2024.     She denies fever and chills. She denies nausea, vomiting, diarrhea or constipation.   Her pain is controlled.      Physical Exam     Breasts: Bilateral breast incisions are clean, dry, intact.  There is no evidence of hematoma or seroma bilaterally.  The bilateral mastectomy skin is without erythema.  Bilateral breast drain sites are clean, dry, intact with serosanguineous fluid.    There were no vitals filed for this visit.      Assessment and Plan     Nathalie Reed is doing well s/p bilateral total mastectomy and right sentinel lymph node biopsy (Dr. Trujillo) and  bilateral immediate breast reconstruction with tissue expander (Natrelle 133S smooth tissue expander, 600ml with 200 mL of intra-operative saline) and acellular dermal matrix (Dr. Jain) on 5/29/2024.     Patient is here today for drain removal.  The remaining bilateral breast drains were removed today due to low volume output.  The patient tolerated this well.  A new dressing of Neosporin, 2 x 2, Tegaderm was placed.  The patient is following up with me in 1 week to start her expansion process.  She is then following up with Dr. Jain on 7-2.  Compression activity guidelines reviewed with the patient and her daughter.    She is interested in implant-based reconstruction.  We will start to look for a second stage date for her.  She was encouraged to contact the office with any questions or concerns.    Questions were answered. Patient understands.     EVA Gupta  6/11/2024  11:48 AM

## 2024-06-11 NOTE — TELEPHONE ENCOUNTER
Calling pt in regards to scheduling surgery.  Informed pt that I have 03/13/2025 available at [facility] with Dr. Jain.  Pt verbalized understanding and in agreement with date and location.  All questions answered.   Encouraged pt to call or [facility] message office with any other questions or concerns.

## 2024-06-11 NOTE — PATIENT INSTRUCTIONS
This is not a pre-op    Surgeon:         Dr. Raciel Jain                                        Tel:         [phone]                                  Fax:        [phone]    Surgery/Procedure: Second stage reconstruction with removal of bilateral breast tissue expanders, placement of permanent implants and autologous fat grafting to the bilateral breasts. 2.5 hours, general anesthesia, outpatient.       Dx Code:     Hospital:  [facility]: [address] [place] [address]           [phone]  [facility]: [address] [place] [address]               [phone]    1. Someone will need to drive you to and from the hospital if your procedure is outpatient.    2.Do not drink alcohol or smoke 24 hours prior to your procedure.    3. Bring a picture ID and your insurance card.    4. You will be contacted by the hospital the day before to confirm the procedure time and location.     5. Do not take any herbal supplements or blood thinners at least one week before your procedure/surgery. This includes NSAID's (aspirin, baby aspirin, Motrin, Ibuprofen, Aleve, Advil, Naproxen, etc), Plavix, fish oil, vitamin E, turmeric, CoQ10, or green tea supplements, etc. *TYLENOL or acetaminophen is ok to take*    6. PRE-OPERATIVE TESTING: History and physical with medical clearance is REQUIRED within 30 days of the surgery date and is mandatory per Dr. Jain. *If this is not done, your surgery will be postponed*  MEDICAL CLEARANCE WITH  ____  CBC  CMP  EKG (within 90 days)    7. If you take Coumadin, Plavix, Xarelto, or Eliquis, please contact your prescribing physician for special instructions on how long to hold. If you take insulin, contact your primary care physician for special instructions.     8. Please inform us if you start or change any medications at least one week before surgery (ex: blood thinners, weight loss medications, diabetic medications, herbal supplements, etc)    9.  Does patient have diagnosis of sleep apnea?    [   ] Yes     [   ]  No    Consent obtained  Photos taken on ______

## 2024-06-18 ENCOUNTER — OFFICE VISIT (OUTPATIENT)
Dept: SURGERY | Facility: CLINIC | Age: 59
End: 2024-06-18

## 2024-06-18 DIAGNOSIS — Z90.13 ABSENCE OF BREAST, BILATERAL: Primary | ICD-10-CM

## 2024-06-18 PROCEDURE — 99024 POSTOP FOLLOW-UP VISIT: CPT

## 2024-06-18 NOTE — PROGRESS NOTES
Nathalie Reed is a 59 year old female who presents today for a follow-up after  bilateral total mastectomy and right sentinel lymph node biopsy (Dr. Trujillo) and  bilateral immediate breast reconstruction with tissue expander (Natrelle 133S smooth tissue expander, 600ml with 200 mL of intra-operative saline) and acellular dermal matrix (Dr. Jain) on 5/29/2024.      She denies fever and chills. She denies nausea, vomiting, diarrhea or constipation.   Her pain is controlled.        Physical Exam     Breasts: Bilateral breast incisions are clean, dry, intact. There is no evidence of hematoma or seroma bilaterally. The bilateral mastectomy skin is without erythema.     There were no vitals filed for this visit.      Assessment and Plan     Nathalie Reed is doing well s/p Bilateral breast incisions are clean, dry, intact. There is no evidence of hematoma or seroma bilaterally. The bilateral mastectomy skin is without erythema.     As her incisions remain well-healed we will start with the expansion process today.  The bilateral breast tissue expander ports were identified using the magnet, these areas were cleansed with Betadine solution.  A sterile butterfly needle was introduced into the bilateral tissue expander ports and 120 mL of saline was introduced.  0 mL of seroma was aspirated bilaterally.  The patient tolerated this well.  Neosporin and Band-Aids were placed.  She is now at a bilateral total of 320 mL of saline.    She will follow-up in 1 week for continued expansion.  She will then follow-up with Dr. Jain on 7-2.     She has a second stage implant based reconstruction date of 3-.  She was instructed to contact the office with any questions or concerns.    Questions were answered. Patient understands.     Ramonita Urena, EVA  6/18/2024  11:18 AM

## 2024-06-19 ENCOUNTER — PATIENT MESSAGE (OUTPATIENT)
Dept: RHEUMATOLOGY | Facility: CLINIC | Age: 59
End: 2024-06-19

## 2024-06-19 NOTE — TELEPHONE ENCOUNTER
From: Nathalie Reed  To: Norah Rodríguez  Sent: 6/19/2024 12:02 PM CDT  Subject: Test results     I got your message regarding my test results. I stopped taking Xeljanz May 14th (before surgery). I just started taking this week and have actually only taken 2 of them so far. I will stop taking them. I was on 3 other medicines after surgery but have recently been off them and have taken Ibuprofen for pain in between the Norco

## 2024-06-21 ENCOUNTER — TELEPHONE (OUTPATIENT)
Dept: RHEUMATOLOGY | Facility: CLINIC | Age: 59
End: 2024-06-21

## 2024-06-21 NOTE — TELEPHONE ENCOUNTER
She has an appointment with me on August 3, for now would recommend to keep it.  If her symptoms are controlled closer to her visit that she can cancel

## 2024-06-21 NOTE — TELEPHONE ENCOUNTER
PA start - Reauthorization     Prior authorization for: Xeljanz XR    Medication form:11mg tablet    Submission method:CoverMy Meds    Spoke with (if by phone):     Date submitted: 6/21/24    Tracking #:[id number]    QF-TB result: None on file

## 2024-06-25 ENCOUNTER — OFFICE VISIT (OUTPATIENT)
Dept: SURGERY | Facility: CLINIC | Age: 59
End: 2024-06-25

## 2024-06-25 DIAGNOSIS — Z90.13 ABSENCE OF BREAST, BILATERAL: Primary | ICD-10-CM

## 2024-06-25 PROCEDURE — 99024 POSTOP FOLLOW-UP VISIT: CPT

## 2024-06-25 NOTE — PROGRESS NOTES
Nathalie Reed is a 59 year old female who presents today for a follow-up after bilateral total mastectomy and right sentinel lymph node biopsy (Dr. Trujillo) and  bilateral immediate breast reconstruction with tissue expander (Natrelle 133S smooth tissue expander, 600ml with 200 mL of intra-operative saline) and acellular dermal matrix (Dr. Jain) on 5/29/2024.   She is now at a bilateral total of 320 mL of saline.       She denies fever and chills. She denies nausea, vomiting, diarrhea or constipation.   Her pain is controlled.        Physical Exam     Breasts: Bilateral breast incisions are clean, dry, intact. There is no evidence of hematoma or seroma bilaterally. The bilateral mastectomy skin is without erythema.     There were no vitals filed for this visit.      Assessment and Plan     Nathalie Reed is doing well s/p bilateral total mastectomy and right sentinel lymph node biopsy (Dr. Trujillo) and  bilateral immediate breast reconstruction with tissue expander (Natrelle 133S smooth tissue expander, 600ml with 200 mL of intra-operative saline) and acellular dermal matrix (Dr. Jain) on 5/29/2024.     As her incisions remain well-healed we will continue with the expansion process today.  The bilateral breast tissue expander ports were identified using the magnet, these areas were cleansed with Betadine solution.  A sterile butterfly needle was introduced into the bilateral tissue expander ports and 60 mL of saline was introduced.  0 mL of seroma was aspirated bilaterally.  The patient tolerated this well.  Neosporin and Band-Aids were placed.  She is now at a bilateral total of 380 mL of saline.    She is following up with Dr. Jain on 7-2 for wound check and continued expansion.  She was encouraged to contact the office any questions or concerns.  She has a second stage implant based surgery date of 3- at [facility].  She is also placed on our wait list.  She was encouraged  Patient calling back and she say the referral should be for Dr. Jaxson Miller  [phone]      Please advise   [phone] to contact the office with any questions or concerns.    Questions were answered. Patient understands.     EVA Gupta  6/25/2024  11:58 AM

## 2024-07-02 ENCOUNTER — OFFICE VISIT (OUTPATIENT)
Dept: SURGERY | Facility: CLINIC | Age: 59
End: 2024-07-02
Payer: COMMERCIAL

## 2024-07-02 DIAGNOSIS — Z90.13 ABSENCE OF BREAST, BILATERAL: Primary | ICD-10-CM

## 2024-07-02 PROCEDURE — 99024 POSTOP FOLLOW-UP VISIT: CPT | Performed by: SURGERY

## 2024-07-02 NOTE — TELEPHONE ENCOUNTER
PA Approved - Script pended; routed to MD for review and signature.     Prior authorization for: Xeljanz XR    Medication form:11mg tablets    Date received: 6/21/2024     Approval #:[id number]    Approved dates:Start Date:05/22/2024;Coverage End Date:06/21/2025;    Pharmacy for medication:[facility]    QF-TB results: None on file

## 2024-07-02 NOTE — PROGRESS NOTES
Nathalie Reed is a 59 year old female who presents today in follow-up.  She currently has a 14 cm base diameter tissue expander filled to 360 cc. She denies fever and chills. She denies nausea, vomiting, diarrhea or constipation.       Physical Examination:  Breasts:Bilateral breast incisions are clean dry and intact.  There is no erythema or seroma noted.  Acceptable shape and symmetry is noted.    Procedure: Bilateral breast were sterilely expanded with 60 cc of saline with total of 420 cc.  No seroma fluid was encountered.      Assessment and Plan:  Patient is doing well.  She will follow-up in 1 week for further expansion.

## 2024-07-03 RX ORDER — TOFACITINIB 11 MG/1
11 TABLET, FILM COATED, EXTENDED RELEASE ORAL DAILY
Qty: 30 TABLET | Refills: 5 | Status: SHIPPED | OUTPATIENT
Start: 2024-07-03

## 2024-07-03 NOTE — TELEPHONE ENCOUNTER
Signed the Xeljanz prescription.  She currently is not taking it due to her high LFTs.  May resume it once her LFTs are normalized

## 2024-07-16 ENCOUNTER — OFFICE VISIT (OUTPATIENT)
Dept: SURGERY | Facility: CLINIC | Age: 59
End: 2024-07-16
Payer: COMMERCIAL

## 2024-07-16 DIAGNOSIS — Z90.13 ABSENCE OF BREAST, BILATERAL: Primary | ICD-10-CM

## 2024-07-16 PROCEDURE — 99024 POSTOP FOLLOW-UP VISIT: CPT

## 2024-07-16 NOTE — PROGRESS NOTES
Nathalie Reed is a 59 year old female who presents today for a follow-up after  bilateral total mastectomy and right sentinel lymph node biopsy (Dr. Trujillo) and  bilateral immediate breast reconstruction with tissue expander (Natrelle 133S smooth tissue expander, 600ml with 200 mL of intra-operative saline) and acellular dermal matrix (Dr. Jain) on 5/29/2024.   She is now at a bilateral total of 4400 mL of saline.     She denies fever and chills. She denies nausea, vomiting, diarrhea or constipation.   Her pain is controlled.        Physical Exam     Breasts: Lateral breast incisions are clean, dry, intact.  There is no evidence of hematoma or seroma bilaterally.  Bilateral mastectomy skin is without erythema.    There were no vitals filed for this visit.      Assessment and Plan     Nathalie Reed is doing well s/p bilateral total mastectomy and right sentinel lymph node biopsy (Dr. Trujillo) and  bilateral immediate breast reconstruction with tissue expander (Natrelle 133S smooth tissue expander, 600ml with 200 mL of intra-operative saline) and acellular dermal matrix (Dr. Jain) on 5/29/2024.     As her incisions remain well-healed we will continue with the expansion process today.  The bilateral breast tissue expander ports were identified using the magnet, these areas were cleansed with Betadine solution.  A sterile butterfly needle was introduced into the bilateral tissue expander ports and 60 mL of saline was introduced.  0 mL of seroma was aspirated bilaterally.  The patient tolerated this well.  Neosporin and Band-Aids were placed.  She is now at a bilateral total of 500 mL of saline.    Patient is following up with me in 1 week for continued expansion.  She is then following up with Dr. Jain for a preoperative visit on 11-19.  She has a second stage implant based reconstruction date of 3-.  She is on our wait list.  She was encouraged to contact the office with any questions  or concerns.    Questions were answered. Patient understands.     Ramonita Urena, EVA  7/16/2024  10:53 AM

## 2024-07-23 ENCOUNTER — OFFICE VISIT (OUTPATIENT)
Dept: SURGERY | Facility: CLINIC | Age: 59
End: 2024-07-23
Payer: COMMERCIAL

## 2024-07-23 DIAGNOSIS — Z90.13 ABSENCE OF BREAST, BILATERAL: Primary | ICD-10-CM

## 2024-07-23 PROCEDURE — 99024 POSTOP FOLLOW-UP VISIT: CPT

## 2024-07-23 NOTE — PROGRESS NOTES
Nathalie Reed is a 59 year old female who presents today for a follow-up after bilateral total mastectomy and right sentinel lymph node biopsy (Dr. Trujillo) and  bilateral immediate breast reconstruction with tissue expander (Natrelle 133S smooth tissue expander, 600ml with 200 mL of intra-operative saline) and acellular dermal matrix (Dr. Jain) on 5/29/2024.   She is now at a bilateral total of 500 mL of saline.     She denies fever and chills. She denies nausea, vomiting, diarrhea or constipation.   Her pain is controlled.        Physical Exam     Breasts: Bilateral breast incisions are clean, dry, intact.  There is no evidence of hematoma or seroma bilaterally.  Bilateral mastectomy skin is without erythema.     There were no vitals filed for this visit.      Assessment and Plan     Nathalie Reed is doing well s/p bilateral total mastectomy and right sentinel lymph node biopsy (Dr. Trujillo) and  bilateral immediate breast reconstruction with tissue expander (Natrelle 133S smooth tissue expander, 600ml with 200 mL of intra-operative saline) and acellular dermal matrix (Dr. Jain) on 5/29/2024.     As her incisions remain well-healed we will continue with the expansion process today.  The bilateral breast tissue expander ports were identified using the magnet, these areas were cleansed with Betadine solution.  A sterile butterfly needle was introduced into the bilateral tissue expander ports and 60 mL of saline was introduced.  0 mL of seroma was aspirated bilaterally.  The patient tolerated this well.  Neosporin and Band-Aids were placed.  She is now at a bilateral total of 560 mL of saline.    Follow-up with me in 1 week for continued expansion, possible over expansion.  She is then following up with Dr. Jain for preoperative visit on 11-19.  She has a second stage implant based reconstruction date of 3-.  She is on our wait list.  She was encouraged to contact the office with any  questions or concerns.    Questions were answered. Patient understands.     Ramonita Urena, VEA  7/23/2024  8:27 AM

## 2024-07-30 ENCOUNTER — OFFICE VISIT (OUTPATIENT)
Dept: SURGERY | Facility: CLINIC | Age: 59
End: 2024-07-30
Payer: COMMERCIAL

## 2024-07-30 DIAGNOSIS — Z90.13 ABSENCE OF BREAST, BILATERAL: Primary | ICD-10-CM

## 2024-07-30 PROCEDURE — 99024 POSTOP FOLLOW-UP VISIT: CPT

## 2024-07-30 NOTE — PROGRESS NOTES
Nathalie Reed is a 59 year old female who presents today for a follow-up after  bilateral total mastectomy and right sentinel lymph node biopsy (Dr. Trujillo) and  bilateral immediate breast reconstruction with tissue expander (Natrelle 133S smooth tissue expander, 600ml with 200 mL of intra-operative saline) and acellular dermal matrix (Dr. Jain) on 5/29/2024.   She is now at a bilateral total of 560 mL of saline.       Physical Exam     Breasts: Bilateral breast incisions are clean, dry, intact.  There is no evidence of hematoma or seroma bilaterally.  Bilateral mastectomy skin is without erythema.     There were no vitals filed for this visit.      Assessment and Plan     Nathalie Reed is doing well s/p bilateral total mastectomy and right sentinel lymph node biopsy (Dr. Trujillo) and  bilateral immediate breast reconstruction with tissue expander (Natrelle 133S smooth tissue expander, 600ml with 200 mL of intra-operative saline) and acellular dermal matrix (Dr. Jain) on 5/29/2024.     As her incisions remain well-healed we will continue with the expansion process today.  The bilateral breast tissue expander ports were identified using the magnet, these areas were cleansed with Betadine solution.  A sterile butterfly needle was introduced into the bilateral tissue expander ports and 60 mL of saline was introduced.  0 mL of seroma was aspirated bilaterally.  The patient tolerated this well.  Neosporin and Band-Aids were placed.  She is now at a bilateral total of 620 mL of saline.    Patient reports that she is now happy with her overexpanded size.  We will stop the expansion process now.  The patient has a second stage implant based reconstruction date of 3- at [facility].  She has a preoperative appointment with Dr. Jain on 11-19.  She was on our wait list.  She was encouraged to contact the office with any questions or concerns.    Questions were answered. Patient  understands.     Ramonita Urena, APRN  7/30/2024  9:43 AM

## 2024-08-20 ENCOUNTER — TELEPHONE (OUTPATIENT)
Dept: RHEUMATOLOGY | Facility: CLINIC | Age: 59
End: 2024-08-20

## 2024-08-20 NOTE — TELEPHONE ENCOUNTER
She should not take the Xeljanz on chemotherapy.  I think that she already is off Xeljanz due to the high liver enzymes but not sure

## 2024-08-20 NOTE — TELEPHONE ENCOUNTER
Hortencia from [facility] calling to state patient will be starting chemo soon, does not have exact start date, will be doing chemo for three months. There is an interaction with Xeljanz, stated can increase immunosuppressant effects, asking if okay to stop while patient has chemo.

## 2024-08-22 NOTE — TELEPHONE ENCOUNTER
Updated Hortencia with [facility]. She was able to see message from Dr. Rodríguez.     Confirmed with patient is currently off Xeljanz.

## 2024-09-20 ENCOUNTER — LAB ENCOUNTER (OUTPATIENT)
Dept: LAB | Facility: HOSPITAL | Age: 59
End: 2024-09-20
Attending: INTERNAL MEDICINE
Payer: COMMERCIAL

## 2024-09-20 DIAGNOSIS — R79.89 ELEVATED LFTS: ICD-10-CM

## 2024-09-20 DIAGNOSIS — Z51.81 MEDICATION MONITORING ENCOUNTER: ICD-10-CM

## 2024-09-20 DIAGNOSIS — M05.79 RHEUMATOID ARTHRITIS INVOLVING MULTIPLE SITES WITH POSITIVE RHEUMATOID FACTOR (HCC): ICD-10-CM

## 2024-09-20 LAB
ALBUMIN SERPL-MCNC: 4.3 G/DL (ref 3.2–4.8)
ALT SERPL-CCNC: 127 U/L
AST SERPL-CCNC: 75 U/L (ref ?–34)
BASOPHILS # BLD AUTO: 0.06 X10(3) UL (ref 0–0.2)
BASOPHILS NFR BLD AUTO: 0.8 %
CREAT BLD-MCNC: 0.63 MG/DL
CRP SERPL-MCNC: 1.3 MG/DL (ref ?–1)
DEPRECATED RDW RBC AUTO: 43.3 FL (ref 35.1–46.3)
EGFRCR SERPLBLD CKD-EPI 2021: 103 ML/MIN/1.73M2 (ref 60–?)
EOSINOPHIL # BLD AUTO: 0.26 X10(3) UL (ref 0–0.7)
EOSINOPHIL NFR BLD AUTO: 3.3 %
ERYTHROCYTE [DISTWIDTH] IN BLOOD BY AUTOMATED COUNT: 13.4 % (ref 11–15)
ERYTHROCYTE [SEDIMENTATION RATE] IN BLOOD: 27 MM/HR
HCT VFR BLD AUTO: 38.9 %
HGB BLD-MCNC: 12.8 G/DL
IMM GRANULOCYTES # BLD AUTO: 0.02 X10(3) UL (ref 0–1)
IMM GRANULOCYTES NFR BLD: 0.3 %
LYMPHOCYTES # BLD AUTO: 1.36 X10(3) UL (ref 1–4)
LYMPHOCYTES NFR BLD AUTO: 17 %
MCH RBC QN AUTO: 29 PG (ref 26–34)
MCHC RBC AUTO-ENTMCNC: 32.9 G/DL (ref 31–37)
MCV RBC AUTO: 88.2 FL
MONOCYTES # BLD AUTO: 0.6 X10(3) UL (ref 0.1–1)
MONOCYTES NFR BLD AUTO: 7.5 %
NEUTROPHILS # BLD AUTO: 5.7 X10 (3) UL (ref 1.5–7.7)
NEUTROPHILS # BLD AUTO: 5.7 X10(3) UL (ref 1.5–7.7)
NEUTROPHILS NFR BLD AUTO: 71.1 %
PLATELET # BLD AUTO: 251 10(3)UL (ref 150–450)
RBC # BLD AUTO: 4.41 X10(6)UL
WBC # BLD AUTO: 8 X10(3) UL (ref 4–11)

## 2024-09-20 PROCEDURE — 82040 ASSAY OF SERUM ALBUMIN: CPT

## 2024-09-20 PROCEDURE — 85652 RBC SED RATE AUTOMATED: CPT

## 2024-09-20 PROCEDURE — 84450 TRANSFERASE (AST) (SGOT): CPT

## 2024-09-20 PROCEDURE — 84460 ALANINE AMINO (ALT) (SGPT): CPT

## 2024-09-20 PROCEDURE — 36415 COLL VENOUS BLD VENIPUNCTURE: CPT

## 2024-09-20 PROCEDURE — 82565 ASSAY OF CREATININE: CPT

## 2024-09-20 PROCEDURE — 86140 C-REACTIVE PROTEIN: CPT

## 2024-09-20 PROCEDURE — 85025 COMPLETE CBC W/AUTO DIFF WBC: CPT

## 2024-11-27 ENCOUNTER — HOSPITAL ENCOUNTER (OUTPATIENT)
Age: 59
Discharge: HOME | End: 2024-11-27
Payer: COMMERCIAL

## 2024-11-27 VITALS
SYSTOLIC BLOOD PRESSURE: 132 MMHG | TEMPERATURE: 99 F | OXYGEN SATURATION: 98 % | HEART RATE: 92 BPM | RESPIRATION RATE: 20 BRPM | DIASTOLIC BLOOD PRESSURE: 75 MMHG

## 2024-11-27 DIAGNOSIS — L23.9 ALLERGIC DERMATITIS: Primary | ICD-10-CM

## 2024-11-27 PROCEDURE — 99203 OFFICE O/P NEW LOW 30 MIN: CPT

## 2024-11-27 PROCEDURE — 99213 OFFICE O/P EST LOW 20 MIN: CPT

## 2024-11-27 RX ORDER — PREDNISONE 20 MG/1
40 TABLET ORAL DAILY
Qty: 10 TABLET | Refills: 0 | Status: SHIPPED | OUTPATIENT
Start: 2024-11-27 | End: 2024-12-02

## 2024-11-27 NOTE — DISCHARGE INSTRUCTIONS
Prednisone 2 tablets daily for 5 days with food  Zyrtec 10 mg daily  If you develop a fever, worsening symptoms, please go to the ER  Please follow-up with your oncologist as discussed

## 2024-11-27 NOTE — ED INITIAL ASSESSMENT (HPI)
Patient currently undergoing chemotherapy.  Patient with itchy red raised rash to face and scalp today.  Similar symptoms yesterday to thighs, and trunk.  Also with bilateral hand itchiness and swelling.  States last chemo was 11/13.  Denies new soaps, foods, medications, lotions, detergents.

## 2024-11-27 NOTE — ED PROVIDER NOTES
Chief Complaint   Patient presents with    Rash Skin Problem       HPI:     Nathalie Reed is a 59 year old female who presents today with a chief complaint of rash to right lower lip, and chin, along with scalp, and low back.  Reports it started yesterday.  Reports the rash is itchy.  Rash is not painful.  No new soaps, lotions, detergents.  No fever. She does have breast cancer, and is undergoing chemotherapy treatment IV every 3 weeks, her last treatment was 2024.    PFSH      PFSH asessment screens reviewed and agree.  Nurses notes reviewed I agree with documentation.    Family History   Problem Relation Age of Onset    Other (Other) Father         low hdl    Cancer Mother         leukemia    Heart Disorder Maternal Grandfather 50     Family history reviewed with patient/caregiver and is not pertinent to presenting problem.  Social History     Socioeconomic History    Marital status:      Spouse name: Not on file    Number of children: Not on file    Years of education: Not on file    Highest education level: Not on file   Occupational History    Occupation: claim adjustor transciptionist   Tobacco Use    Smoking status: Former     Current packs/day: 0.00     Average packs/day: 0.7 packs/day for 28.0 years (21.0 ttl pk-yrs)     Types: Cigarettes     Start date:      Quit date: 2006     Years since quittin.4    Smokeless tobacco: Never    Tobacco comments:     social smoker, max close to a pack a day, aver 0.5-0.75 pk/dy   Vaping Use    Vaping status: Never Used   Substance and Sexual Activity    Alcohol use: Yes     Comment: social    Drug use: No    Sexual activity: Yes     Partners: Male     Birth control/protection: Tubal Ligation   Other Topics Concern     Service Not Asked    Blood Transfusions Not Asked    Caffeine Concern Not Asked    Occupational Exposure Not Asked    Hobby Hazards Not Asked    Sleep Concern Not Asked    Stress Concern Not Asked    Weight  Concern Not Asked    Special Diet Not Asked    Back Care Not Asked    Exercise Yes     Comment: treadmill, spin bike    Bike Helmet Not Asked    Seat Belt Not Asked    Self-Exams Not Asked   Social History Narrative    Not on file     Social Drivers of Health     Financial Resource Strain: Not on file   Food Insecurity: Not on file   Transportation Needs: Not on file   Physical Activity: Not on file   Stress: Not on file   Social Connections: Not on file   Housing Stability: Not on file         ROS:   Positive for stated complaint: rash  All other systems reviewed and negative except as noted above.  Constitutional and Vital Signs Reviewed.      Physical Exam:     Rash:    Erythematous urticaria noted to right lower lip, chin, scalp, low back.  No vesicles. Areas non-tender.     Physical Exam:  /75   Pulse 92   Temp 99.1 °F (37.3 °C) (Oral)   Resp 20   LMP 08/11/2017 (Exact Date)   SpO2 98%   GENERAL: well developed, well nourished, well hydrated, no distress  SKIN: good skin turgor, see above description for rash  HEENT: atraumatic, normocephalic, ears, nose and throat are clear  EYES: sclera non icteric bilateral  NECK: supple, no adenopathy  LUNGS: clear to auscultation, no RRW  CARDIO: RRR without murmur  GI: soft, non-tender, normal bowel sounds  EXTREMITIES: no cyanosis or edema. MARISCAL without difficulty.    MDM/Assessment/Plan:   Orders for this encounter:    Orders Placed This Encounter    predniSONE 20 MG Oral Tab     Sig: Take 2 tablets (40 mg total) by mouth daily for 5 days.     Dispense:  10 tablet     Refill:  0       Labs performed this visit:  No results found for this or any previous visit (from the past 10 hours).    MDM:  Medical Decision Making  Differentials considered: Allergic dermatitis versus contact dermatitis versus anaphylaxis versus bacterial dermatitis versus SJS versus other    HPI and exam most consistent with allergic dermatitis.  Patient's vitals are normal, no respiratory  distress, low suspicion for anaphylaxis.  No sign of bacterial infection.  No skin peeling, normal mucous membranes, low suspicion for SJS.  Patient did contact her oncologist prior to coming here, oncologist does not believe this is related to chemotherapy.  Will start prednisone and Zyrtec.  She was advised to go to the ER for fever or any new or worsening symptoms.  He was advised to contact her oncologist and let her know that she is on prednisone.  Patient verbalized understanding and agreeable to plan of care.     Case discussed with Dr. Junior Marion     Amount and/or Complexity of Data Reviewed  External Data Reviewed: labs.     Details: 11/13/2024- normal WBC          Diagnosis:    ICD-10-CM    1. Allergic dermatitis  L23.9           All results reviewed and discussed with patient.  See AVS for detailed discharge instructions for your condition today.    Follow Up with:  No follow-up provider specified.

## 2025-02-25 ENCOUNTER — OFFICE VISIT (OUTPATIENT)
Facility: CLINIC | Age: 60
End: 2025-02-25
Payer: COMMERCIAL

## 2025-02-25 DIAGNOSIS — Z90.13 ABSENCE OF BREAST, BILATERAL: Primary | ICD-10-CM

## 2025-02-25 PROCEDURE — 99213 OFFICE O/P EST LOW 20 MIN: CPT | Performed by: SURGERY

## 2025-02-25 NOTE — PROGRESS NOTES
Nathalie Reed is a 59 year old female who presents today in follow-up.   She currently has a 14 cm base diameter tissue expander filled to 620cc.    Physical Examination:  Chest: A left upper chest wall port is noted in place.    Breasts:Bilateral breast incisions are clean dry and intact.  There is no erythema or seroma noted.  Acceptable shape and symmetry is noted.  Volume deficiency is noted at the superomedial aspect of bilateral breast.    Abdomen: Moderate upper abdominal and flank lipodystrophy is noted.  Well-healed chest tube sites are noted in the upper abdomen.  There are no palpable hernias noted.    Assessment and Plan:  We discussed the plan for the second stage which will include removal of bilateral breast tissue expanders, placement of smooth, round, silicone implants, and fat grafting to bilateral reconstructed breasts.  The nature of the procedure was reviewed with the patient.  We discussed the risks of surgery including but not limited to bleeding, infection, scarring, delayed wound healing, asymmetry, implant malposition, implant infection or extrusion requiring removal, ALCL, capsular contracture, hypertrophic scarring or keloid, injury to intra-abdominal structures, contour abnormalities, cysts or calcifications requiring biopsy, and need for further surgery.  We reviewed the expected postoperative course including possible need for drains, as well as need for activity limitation and compression.  Multiple questions were answered the patient's satisfaction.  No guarantees as to outcome were offered.  The patient expresses understanding and wishes to proceed. A total of 20 minutes was spent reviewing the patient's history and diagnostic testing, examining the patient, reviewing reconstructive options, and coordinating the patient's care.

## 2025-02-25 NOTE — PATIENT INSTRUCTIONS
Surgeon:         Dr. Raciel Jain                                        Tel:         [phone]                                  Fax:        [phone]    Surgery/Procedure: Second stage reconstruction with removal of bilateral breast tissue expanders, placement of permanent implants and autologous fat grafting to the bilateral breasts, port a cath removal, 2.5 hours, general anesthesia, outpatient.      Dx Code: Z90.13    Hospital:  [facility]: [facility] [address] [facility], [place] [address]               [phone]  3/13/2025    1. Someone will need to drive you to and from the hospital if your procedure is outpatient.    2.Do not drink alcohol or smoke 24 hours prior to your procedure.    3. Bring a picture ID and your insurance card.    4. You will be contacted by the hospital the day before to confirm the procedure time and location.     5. Do not take any herbal supplements or blood thinners at least one week before your procedure/surgery. This includes NSAID's (aspirin, baby aspirin, Motrin, Ibuprofen, Aleve, Advil, Naproxen, etc), Fish oil, vitamin E, turmeric, CoQ10, or green tea supplements, etc. *TYLENOL or acetaminophen is ok to take*    6. PRE-OPERATIVE TESTING: History and physical with medical clearance is REQUIRED within 30 days of the surgery date and is mandatory per Dr. Jain. *If this is not done, your surgery will be postponed*  MEDICAL CLEARANCE WITH DR. Bustillo  CBC  CMP  EKG (within 90 days)  Cardiac clearance with Dr. Carlisle    7. If you take Coumadin, Plavix, Xarelto, or Eliquis, please contact your prescribing physician for special instructions on how long to hold. If you take insulin, contact your primary care physician for special instructions.     8. Please inform us if you start or change any medications at least one week before surgery (ex: blood thinners, weight loss medications, diabetic medications, herbal supplements, etc)    9. Does patient have diagnosis of sleep apnea?     [   ] Yes     [ X ]  No    Consent obtained  Photos taken on 2/25/2025

## 2025-02-27 ENCOUNTER — TELEPHONE (OUTPATIENT)
Dept: SURGERY | Facility: CLINIC | Age: 60
End: 2025-02-27

## 2025-02-27 NOTE — TELEPHONE ENCOUNTER
Called patient's cardiologist's office to request a fax of the EKG tracing be sent to our office. Provided fax and callback numbers.

## 2025-03-03 DIAGNOSIS — Z90.13 ABSENCE OF BREAST, BILATERAL: Primary | ICD-10-CM

## 2025-03-10 RX ORDER — ASPIRIN 81 MG/1
81 TABLET ORAL DAILY
COMMUNITY
End: 2025-03-13

## 2025-03-10 RX ORDER — FUROSEMIDE 20 MG/1
20 TABLET ORAL DAILY
COMMUNITY
Start: 2025-02-22

## 2025-03-10 RX ORDER — ANASTROZOLE 1 MG/1
1 TABLET ORAL DAILY
COMMUNITY
Start: 2025-01-07

## 2025-03-13 ENCOUNTER — HOSPITAL ENCOUNTER (OUTPATIENT)
Facility: HOSPITAL | Age: 60
Setting detail: HOSPITAL OUTPATIENT SURGERY
Discharge: HOME | End: 2025-03-13
Attending: SURGERY | Admitting: SURGERY
Payer: COMMERCIAL

## 2025-03-13 ENCOUNTER — ANESTHESIA EVENT (OUTPATIENT)
Dept: SURGERY | Facility: HOSPITAL | Age: 60
End: 2025-03-13
Payer: COMMERCIAL

## 2025-03-13 ENCOUNTER — ANESTHESIA (OUTPATIENT)
Dept: SURGERY | Facility: HOSPITAL | Age: 60
End: 2025-03-13
Payer: COMMERCIAL

## 2025-03-13 VITALS
SYSTOLIC BLOOD PRESSURE: 115 MMHG | BODY MASS INDEX: 33.29 KG/M2 | WEIGHT: 195 LBS | HEART RATE: 70 BPM | DIASTOLIC BLOOD PRESSURE: 63 MMHG | TEMPERATURE: 98 F | HEIGHT: 64 IN | RESPIRATION RATE: 16 BRPM | OXYGEN SATURATION: 96 %

## 2025-03-13 DIAGNOSIS — Z90.13 ABSENCE OF BREAST, BILATERAL: Primary | ICD-10-CM

## 2025-03-13 PROCEDURE — 0JD83ZZ EXTRACTION OF ABDOMEN SUBCUTANEOUS TISSUE AND FASCIA, PERCUTANEOUS APPROACH: ICD-10-PCS | Performed by: SURGERY

## 2025-03-13 PROCEDURE — 0HRV0JZ REPLACEMENT OF BILATERAL BREAST WITH SYNTHETIC SUBSTITUTE, OPEN APPROACH: ICD-10-PCS | Performed by: SURGERY

## 2025-03-13 PROCEDURE — 0HPU0NZ REMOVAL OF TISSUE EXPANDER FROM LEFT BREAST, OPEN APPROACH: ICD-10-PCS | Performed by: SURGERY

## 2025-03-13 PROCEDURE — 88305 TISSUE EXAM BY PATHOLOGIST: CPT | Performed by: SURGERY

## 2025-03-13 PROCEDURE — 0JPT0WZ REMOVAL OF TOTALLY IMPLANTABLE VASCULAR ACCESS DEVICE FROM TRUNK SUBCUTANEOUS TISSUE AND FASCIA, OPEN APPROACH: ICD-10-PCS | Performed by: SURGERY

## 2025-03-13 PROCEDURE — 0HUV37Z SUPPLEMENT BILATERAL BREAST WITH AUTOLOGOUS TISSUE SUBSTITUTE, PERCUTANEOUS APPROACH: ICD-10-PCS | Performed by: SURGERY

## 2025-03-13 PROCEDURE — 0HPT0NZ REMOVAL OF TISSUE EXPANDER FROM RIGHT BREAST, OPEN APPROACH: ICD-10-PCS | Performed by: SURGERY

## 2025-03-13 PROCEDURE — 88300 SURGICAL PATH GROSS: CPT | Performed by: SURGERY

## 2025-03-13 DEVICE — NATRELLE INSPIRA SSF 695CC FULL PROFILE SMOOTH ROUND SILICONE
Type: IMPLANTABLE DEVICE | Status: FUNCTIONAL
Brand: NATRELLE INSPIRA SOFTTOUCH BREAST IMPLANTS

## 2025-03-13 RX ORDER — ONDANSETRON 4 MG/1
4 TABLET, FILM COATED ORAL EVERY 8 HOURS PRN
Qty: 12 TABLET | Refills: 0 | Status: SHIPPED | OUTPATIENT
Start: 2025-03-13

## 2025-03-13 RX ORDER — HYDROCODONE BITARTRATE AND ACETAMINOPHEN 5; 325 MG/1; MG/1
1 TABLET ORAL ONCE
Status: COMPLETED | OUTPATIENT
Start: 2025-03-13 | End: 2025-03-13

## 2025-03-13 RX ORDER — SODIUM CHLORIDE, SODIUM LACTATE, POTASSIUM CHLORIDE, CALCIUM CHLORIDE 600; 310; 30; 20 MG/100ML; MG/100ML; MG/100ML; MG/100ML
INJECTION, SOLUTION INTRAVENOUS CONTINUOUS
Status: DISCONTINUED | OUTPATIENT
Start: 2025-03-13 | End: 2025-03-13

## 2025-03-13 RX ORDER — ROCURONIUM BROMIDE 10 MG/ML
INJECTION, SOLUTION INTRAVENOUS AS NEEDED
Status: DISCONTINUED | OUTPATIENT
Start: 2025-03-13 | End: 2025-03-14 | Stop reason: SURG

## 2025-03-13 RX ORDER — MIDAZOLAM HYDROCHLORIDE 1 MG/ML
INJECTION INTRAMUSCULAR; INTRAVENOUS AS NEEDED
Status: DISCONTINUED | OUTPATIENT
Start: 2025-03-13 | End: 2025-03-14 | Stop reason: SURG

## 2025-03-13 RX ORDER — FAMOTIDINE 10 MG/ML
20 INJECTION, SOLUTION INTRAVENOUS ONCE
Status: COMPLETED | OUTPATIENT
Start: 2025-03-13 | End: 2025-03-13

## 2025-03-13 RX ORDER — HYDROMORPHONE HYDROCHLORIDE 1 MG/ML
0.2 INJECTION, SOLUTION INTRAMUSCULAR; INTRAVENOUS; SUBCUTANEOUS EVERY 5 MIN PRN
Status: DISCONTINUED | OUTPATIENT
Start: 2025-03-13 | End: 2025-03-13

## 2025-03-13 RX ORDER — ACETAMINOPHEN 500 MG
1000 TABLET ORAL ONCE
Status: COMPLETED | OUTPATIENT
Start: 2025-03-13 | End: 2025-03-13

## 2025-03-13 RX ORDER — DOCUSATE SODIUM 100 MG/1
100 CAPSULE, LIQUID FILLED ORAL 2 TIMES DAILY
Qty: 30 CAPSULE | Refills: 1 | Status: SHIPPED | OUTPATIENT
Start: 2025-03-13

## 2025-03-13 RX ORDER — ONDANSETRON 2 MG/ML
4 INJECTION INTRAMUSCULAR; INTRAVENOUS EVERY 6 HOURS PRN
Status: DISCONTINUED | OUTPATIENT
Start: 2025-03-13 | End: 2025-03-13

## 2025-03-13 RX ORDER — DEXAMETHASONE SODIUM PHOSPHATE 4 MG/ML
VIAL (ML) INJECTION AS NEEDED
Status: DISCONTINUED | OUTPATIENT
Start: 2025-03-13 | End: 2025-03-14 | Stop reason: SURG

## 2025-03-13 RX ORDER — PROCHLORPERAZINE EDISYLATE 5 MG/ML
5 INJECTION INTRAMUSCULAR; INTRAVENOUS EVERY 8 HOURS PRN
Status: DISCONTINUED | OUTPATIENT
Start: 2025-03-13 | End: 2025-03-13

## 2025-03-13 RX ORDER — LIDOCAINE HYDROCHLORIDE 10 MG/ML
INJECTION, SOLUTION EPIDURAL; INFILTRATION; INTRACAUDAL; PERINEURAL AS NEEDED
Status: DISCONTINUED | OUTPATIENT
Start: 2025-03-13 | End: 2025-03-14 | Stop reason: SURG

## 2025-03-13 RX ORDER — LIDOCAINE HYDROCHLORIDE AND EPINEPHRINE 10; 10 MG/ML; UG/ML
INJECTION, SOLUTION INFILTRATION; PERINEURAL AS NEEDED
Status: DISCONTINUED | OUTPATIENT
Start: 2025-03-13 | End: 2025-03-13 | Stop reason: HOSPADM

## 2025-03-13 RX ORDER — NALOXONE HYDROCHLORIDE 0.4 MG/ML
0.08 INJECTION, SOLUTION INTRAMUSCULAR; INTRAVENOUS; SUBCUTANEOUS AS NEEDED
Status: DISCONTINUED | OUTPATIENT
Start: 2025-03-13 | End: 2025-03-13

## 2025-03-13 RX ORDER — FAMOTIDINE 20 MG/1
20 TABLET, FILM COATED ORAL ONCE
Status: COMPLETED | OUTPATIENT
Start: 2025-03-13 | End: 2025-03-13

## 2025-03-13 RX ORDER — HYDROCODONE BITARTRATE AND ACETAMINOPHEN 5; 325 MG/1; MG/1
1-2 TABLET ORAL EVERY 4 HOURS PRN
Qty: 20 TABLET | Refills: 0 | Status: SHIPPED | OUTPATIENT
Start: 2025-03-13

## 2025-03-13 RX ORDER — ONDANSETRON 2 MG/ML
INJECTION INTRAMUSCULAR; INTRAVENOUS AS NEEDED
Status: DISCONTINUED | OUTPATIENT
Start: 2025-03-13 | End: 2025-03-14 | Stop reason: SURG

## 2025-03-13 RX ORDER — CEPHALEXIN 500 MG/1
500 CAPSULE ORAL 4 TIMES DAILY
Qty: 20 CAPSULE | Refills: 0 | Status: SHIPPED | OUTPATIENT
Start: 2025-03-13 | End: 2025-03-18

## 2025-03-13 RX ADMIN — MIDAZOLAM HYDROCHLORIDE 2 MG: 1 INJECTION INTRAMUSCULAR; INTRAVENOUS at 13:36:00

## 2025-03-13 RX ADMIN — LIDOCAINE HYDROCHLORIDE 50 MG: 10 INJECTION, SOLUTION EPIDURAL; INFILTRATION; INTRACAUDAL; PERINEURAL at 13:39:00

## 2025-03-13 RX ADMIN — ROCURONIUM BROMIDE 10 MG: 10 INJECTION, SOLUTION INTRAVENOUS at 14:55:00

## 2025-03-13 RX ADMIN — ONDANSETRON 4 MG: 2 INJECTION INTRAMUSCULAR; INTRAVENOUS at 15:24:00

## 2025-03-13 RX ADMIN — SODIUM CHLORIDE, SODIUM LACTATE, POTASSIUM CHLORIDE, CALCIUM CHLORIDE: 600; 310; 30; 20 INJECTION, SOLUTION INTRAVENOUS at 13:36:00

## 2025-03-13 RX ADMIN — DEXAMETHASONE SODIUM PHOSPHATE 4 MG: 4 MG/ML VIAL (ML) INJECTION at 15:24:00

## 2025-03-13 RX ADMIN — ROCURONIUM BROMIDE 20 MG: 10 INJECTION, SOLUTION INTRAVENOUS at 15:13:00

## 2025-03-13 RX ADMIN — SODIUM CHLORIDE, SODIUM LACTATE, POTASSIUM CHLORIDE, CALCIUM CHLORIDE: 600; 310; 30; 20 INJECTION, SOLUTION INTRAVENOUS at 16:00:00

## 2025-03-13 RX ADMIN — ROCURONIUM BROMIDE 50 MG: 10 INJECTION, SOLUTION INTRAVENOUS at 13:39:00

## 2025-03-13 NOTE — ANESTHESIA PROCEDURE NOTES
Airway  Date/Time: 3/13/2025 1:52 PM  Urgency: elective      General Information and Staff    Patient location during procedure: OR  Anesthesiologist: Agustina Anne MD  Performed: anesthesiologist   Performed by: Agustina Anne MD  Authorized by: Agustina Anne MD      Indications and Patient Condition  Indications for airway management: anesthesia  Sedation level: deep  Preoxygenated: yes  Patient position: sniffing  Mask difficulty assessment: 1 - vent by mask    Final Airway Details  Final airway type: endotracheal airway      Successful airway: ETT  Cuffed: yes   Successful intubation technique: Video laryngoscopy  Endotracheal tube insertion site: oral  Blade: Earlene  Blade size: #4  ETT size (mm): 7.0    Cormack-Lehane Classification: grade I - full view of glottis  Placement verified by: capnometry   Measured from: lips  ETT to lips (cm): 22  Number of attempts at approach: 1  Number of other approaches attempted: 0

## 2025-03-13 NOTE — BRIEF OP NOTE
Pre-Operative Diagnosis: Absence of breast, bilateral [Z90.13]     Post-Operative Diagnosis: Absence of breast, bilateral [Z90.13]      Procedure Performed:   Second stage reconstruction with removal of bilateral breast tissue expanders, placement of permanent implants and autologous fat grafting to the bilateral breasts, port a cath removal,    Surgeons and Role:  Panel 1:     * Raciel Jain MD - Primary  Panel 2:     * Raciel Jain MD - Primary    Assistant(s):  APN: Ramonita Urena APRN     Surgical Findings: Nl     Specimen: Bilateral breast scar     Estimated Blood Loss: 10ml      Raciel Jain MD  3/13/2025  4:05 PM

## 2025-03-13 NOTE — PROGRESS NOTES
History of Present Illness:   The patient is a 59 year old female with a history of bilateral mastectomy and tissue expander reconstruction who now presents for exchange to permanent implants and fat grafting.  She completed adjuvant therapies and also request port removal.  She underwent medical clearance by Dr. Bustillo as well as cardiology clearance by Dr. Carlisle.      Past Medical History:      Past Medical History:    ASTHMA    Asthma (HCC)    Atherosclerosis of coronary artery    Cancer (HCC)    Basal Cell Carcinoma    Cancer of breast (HCC)    RIGHT BREAST    Coronary atherosclerosis    double bypass    Depression    HEART DISEASE    2v CABG    High cholesterol    Hx of motion sickness    IBS (irritable bowel syndrome)    Osteoarthritis    Other and unspecified hyperlipidemia    Personal history of antineoplastic chemotherapy    Rheumatoid arthritis (HCC)         Past Surgical History:  Past Surgical History:   Procedure Laterality Date    Cabg  01/01/2006    cabg x 2    Hysteroscopy      myomectomy    Mastectomy left      Mastectomy right      Other surgical history      LSC ectopic    Other surgical history  12/27/2012    Prep of wound to right cheek adjacent tissue rearrangment to right cheek GSH/    Skin surgery  12/13/2012    BCC nod / R inf. eyelid / Mohs surgery by Dr. Reed    Tubal ligation           Medications:    Medications Ordered Prior to Encounter[1]      Allergies:    Allergies[2]      Family History:   Family History   Problem Relation Age of Onset    Other (Other) Father         low hdl    Cancer Mother         leukemia    Heart Disorder Maternal Grandfather 50         Social History:  History   Alcohol Use    Yes     Comment: social       History   Smoking Status    Former    Types: Cigarettes   Smokeless Tobacco    Never       History   Drug Use No           Review of Systems:    General:   The patient denies, fever, chills, night sweats, fatigue, generalized weakness, change in  appetite, weight loss, or weight gain.    Endocrine:   There is no history of poor/slow wound healing, weight loss/gain, fertility or hormone problems, cold intolerance, heat intolerance, excessive thirst, or thyroid disease.     Allergic/Immunologic:  There is no history of hives, hay fever, angioedema or anaphylaxis.    HEENT:    The patient denies ear pain, ear drainage, hearing loss, change in vision, double vision, cataracts, glaucoma, nasal congestion, nosebleed, hoarseness, sore throat, or swollen glands    Respiratory:   The patient denies shortness of breath, cough, bloody cough, phlegm, asthma, or wheezing    Cardiovascular:  The patient denies chest pain/pressure, palpitations, irregular heartbeat, high blood pressure, stroke, or leg swelling    Breasts:  Patient denies breast masses, pain, change in the breast skin, skin dimpling, nipple discharge, or rash    Gastrointestinal:   There is no history of difficulty or pain with swallowing, reflux symptoms, nausea, vomiting, dark/ bloody stools, diarrhea, constipation,  change in bowel habits, or abdominal pain.     Genitourinary:  The patient denies frequent urination, needing to get up at night to urinate, urinary hesitancy or retaining urine, painful urination, urinary incontinence, decreased urine stream, blood in the urine, or vaginal/penile discharge.    Skin:   The patient denies rash, itching, skin lesions, dry skin, change in skin color or change in moles, sunburns, or sunburns with blistering.     Hematologic/Lymphatic:  The patient denies easily bruising or bleeding, persistent swollen glands or lymph nodes, bleeding disorders, blood clots, or pulmonary embolism.     Gynecologic:  The patient denies irregular menses, pelvic pain, pain with intercourse, painful menses, or pregnancy    Musculoskeletal:  The patient denies muscle aches/pain, joint pain, stiff joints, neck pain, back pain or bone pain.    Neurologic:  There is no history of migraines  or severe headaches, seizure/epilepsy, speech problems, coordination problems, trembling/tremors, fainting/black outs, dizziness, memory problems, loss of sensation/numbness, problems walking, weakness, tingling or burning in hands/feet.     Psychiatric:  There is no history of abusive relationship, bipolar disorder, sleep disturbance, anxiety, depression or feeling of despair.    Physical Exam:    Ht 1.626 m (5' 4\")   Wt 90.7 kg (200 lb)   LMP 08/11/2017 (Exact Date)   BMI 34.33 kg/m²     The patient is awake, alert, and oriented.  She is a well-nourished, well-developed female who appears her stated age. Her speech patterns and movements are normal, and affect is appropriate.    HEENT: The head is normocephalic. The neck is supple. The thyroid is not enlarged and is without palpable masses.  The trachea is in the midline. Conjunctiva are clear, non-icteric.    Breasts: Breasts:Bilateral breast incisions are clean dry and intact.  There is no erythema or seroma noted.  Acceptable shape and symmetry is noted.  Volume deficiency is noted at the superomedial aspect of bilateral breast.     Abdomen:  Abdomen: Moderate upper abdominal and flank lipodystrophy is noted.  Well-healed chest tube sites are noted in the upper abdomen.  There are no palpable hernias noted.     Lymph Nodes:  There is no cervical, supraclavicular, or axillary lymphadenopathy appreciated.    Back: There is no vertebral column tenderness.    Skin: The skin appears normal. There are no suspicious appearing rashes or lesions.    Extremities: The extremities are without deformity, cyanosis or edema.    Impression:   The patient is a 59 year old female history of bilateral mastectomy and tissue expander reconstruction who now presents for exchange of permanent plans, fat grafting, and port removal    Discussion and Plan:  We reviewed the plan for the second stage which will include removal of bilateral breast tissue expanders, placement of smooth,  round, silicone implants,fat grafting to bilateral reconstructed breasts, and port removal.  The nature of the procedure was reviewed with the patient.  We reviewed the risks of surgery including but not limited to bleeding, infection, scarring, delayed wound healing, asymmetry, implant malposition, implant infection or extrusion requiring removal, ALCL, capsular contracture, hypertrophic scarring or keloid, injury to intra-abdominal structures, contour abnormalities, cysts or calcifications requiring biopsy, and need for further surgery.  We reviewed the expected postoperative course including possible need for drains, as well as need for activity limitation and compression.  Multiple questions were answered the patient's satisfaction.  No guarantees as to outcome were offered.  The patient expresses understanding and wishes to proceed.          [1]   No current facility-administered medications on file prior to encounter.     Current Outpatient Medications on File Prior to Encounter   Medication Sig Dispense Refill    furosemide 20 MG Oral Tab Take 1 tablet (20 mg total) by mouth daily.      anastrozole 1 MG Oral Tab tab Take 1 tablet (1 mg total) by mouth daily.      aspirin 81 MG Oral Tab EC Take 1 tablet (81 mg total) by mouth daily.      ZOLPIDEM 5 MG Oral Tab TAKE  1 TABLET BY MOUTH   AT BEDTIME  AS  NEEDED  FOR SLEEP 30 tablet 5    Cholecalciferol (VITAMIN D3) 50 MCG (2000 UT) Oral Chew Tab       VENLAFAXINE 150 MG Oral Capsule SR 24 Hr TAKE ONE CAPSULE BY MOUTH ONE TIME DAILY 90 capsule 3    rosuvastatin (CRESTOR) 20 MG Oral Tab Take 1 tablet (20 mg total) by mouth daily. 90 tablet 3   [2]   Allergies  Allergen Reactions    Lipitor [Atorvastatin Calcium] RASH    Niacin FACE FLUSHING

## 2025-03-13 NOTE — ANESTHESIA PREPROCEDURE EVALUATION
Anesthesia PreOp Note    HPI:     Nathalie Reed is a 59 year old female who presents for preoperative consultation requested by: Raciel Jain MD    Date of Surgery: 3/13/2025    Procedure(s):  Second stage reconstruction with removal of bilateral breast tissue expanders, placement of permanent implants and autologous fat grafting to the bilateral breasts, port a cath removal,  CATHETER REMOVAL  Indication: Absence of breast, bilateral [Z90.13]    Relevant Problems   No relevant active problems       NPO:                         History Review:  Patient Active Problem List    Diagnosis Date Noted    Absence of breast, bilateral 06/07/2024    Sprain of left ankle, unspecified ligament,  11/16/2016    Dyslipidemia 12/28/2015    Personal history of other malignant neoplasm of skin 12/13/2012    Pure hypercholesterolemia 07/30/2012    Coronary atherosclerosis 07/30/2012    Hx of CABG 09/29/2006       Past Medical History:    ASTHMA    Asthma (HCC)    Atherosclerosis of coronary artery    Cancer (HCC)    Basal Cell Carcinoma    Cancer of breast (HCC)    RIGHT BREAST    Coronary atherosclerosis    double bypass    Depression    HEART DISEASE    2v CABG    High cholesterol    Hx of motion sickness    IBS (irritable bowel syndrome)    Osteoarthritis    Other and unspecified hyperlipidemia    Personal history of antineoplastic chemotherapy    Rheumatoid arthritis (HCC)       Past Surgical History:   Procedure Laterality Date    Cabg  01/01/2006    cabg x 2    Hysteroscopy      myomectomy    Mastectomy left      Mastectomy right      Other surgical history      LSC ectopic    Other surgical history  12/27/2012    Prep of wound to right cheek adjacent tissue rearrangment to right cheek GSH/    Skin surgery  12/13/2012    BCC nod / R inf. eyelid / Mohs surgery by Dr. Reed    Tubal ligation         Prescriptions Prior to Admission[1]  Current Medications and Prescriptions Ordered in  Epic[2]    Allergies[3]    Family History   Problem Relation Age of Onset    Other (Other) Father         low hdl    Cancer Mother         leukemia    Heart Disorder Maternal Grandfather 50     Social History     Socioeconomic History    Marital status:    Occupational History    Occupation: claim adjustor transciptionist   Tobacco Use    Smoking status: Former     Current packs/day: 0.00     Average packs/day: 0.7 packs/day for 28.0 years (21.0 ttl pk-yrs)     Types: Cigarettes     Start date:      Quit date: 2006     Years since quittin.6    Smokeless tobacco: Never    Tobacco comments:     Quit in    Vaping Use    Vaping status: Never Used   Substance and Sexual Activity    Alcohol use: Yes     Comment: social    Drug use: No    Sexual activity: Yes     Partners: Male     Birth control/protection: Tubal Ligation   Other Topics Concern    Exercise Yes     Comment: treadmill, spin bike       Available pre-op labs reviewed.             Vital Signs:  Body mass index is 34.33 kg/m².   height is 1.626 m (5' 4\") and weight is 90.7 kg (200 lb).   Vitals:    03/10/25 1055   Weight: 90.7 kg (200 lb)   Height: 1.626 m (5' 4\")        Anesthesia Evaluation     Patient summary reviewed and Nursing notes reviewed    Airway   Mallampati: III  TM distance: <3 FB  Neck ROM: limited  Dental      Pulmonary     breath sounds clear to auscultation  (+) asthma  Cardiovascular   (+) CAD, CABG/stent    Rhythm: regular  Rate: normal  ROS comment: EKG NSR, RBBB     TTE   1. Left ventricle: The cavity size is normal. Wall thickness is mildly      increased. Systolic function is by the biplane method of disks. The      estimated ejection fraction is 55-60%. Wall motion is normal; there are      no regional wall motion abnormalities. Left ventricular diastolic      function parameters are normal.   2. Ventricular septum: Thickness is mildly increased.   3. Aortic valve: The peak systolic velocity is 1.5m/sec. The  mean systolic      gradient is 4mm Hg. The peak systolic gradient is 9mm Hg. The valve area      is 1.7cm^2. The valve area index is 0.82cm^2/m^2.   4. Mitral valve: The annulus is mildly thickened. The leaflets are mildly      thickened. There is mild regurgitation.   5. Right ventricle: The cavity size is normal. Wall thickness is normal.      Systolic function is normal. Estimation of the right ventricular systolic      pressure is mildly increased. The estimated peak pressure is 35mm Hg. The      RV pressure during systole is 35mm Hg.   6. Tricuspid valve: There is mild-moderate regurgitation.   7. Pericardium, extracardiac: There is no significant pericardial effusion. \     Nuc 2024 normal      Neuro/Psych    (+)   depression      GI/Hepatic/Renal      Endo/Other    (+) arthritis  Abdominal                  Anesthesia Plan:   ASA:  3  Plan:   General  Airway:  ETT  Informed Consent Plan and Risks Discussed With:  Patient  Discussed plan with:  Attending      I have informed Nathalie Reed and/or legal guardian or family member of the nature of the anesthetic plan, benefits, risks including possible dental damage if relevant, major complications, and any alternative forms of anesthetic management.   All of the patient's questions were answered to the best of my ability. The patient desires the anesthetic management as planned.  Agustina Anne MD  3/13/2025 11:25 AM  Present on Admission:  **None**           [1]   No medications prior to admission.   [2]   No current Epic-ordered facility-administered medications on file.     Current Outpatient Medications Ordered in Epic   Medication Sig Dispense Refill    furosemide 20 MG Oral Tab Take 1 tablet (20 mg total) by mouth daily.      anastrozole 1 MG Oral Tab tab Take 1 tablet (1 mg total) by mouth daily.      aspirin 81 MG Oral Tab EC Take 1 tablet (81 mg total) by mouth daily.      ZOLPIDEM 5 MG Oral Tab TAKE  1 TABLET BY MOUTH   AT BEDTIME  AS   NEEDED  FOR SLEEP 30 tablet 5    Cholecalciferol (VITAMIN D3) 50 MCG (2000 UT) Oral Chew Tab       VENLAFAXINE 150 MG Oral Capsule SR 24 Hr TAKE ONE CAPSULE BY MOUTH ONE TIME DAILY 90 capsule 3    rosuvastatin (CRESTOR) 20 MG Oral Tab Take 1 tablet (20 mg total) by mouth daily. 90 tablet 3    Tofacitinib Citrate ER (XELJANZ XR) 11 MG Oral Tablet 24 Hr Take 11 mg by mouth daily. 30 tablet 5   [3]   Allergies  Allergen Reactions    Lipitor [Atorvastatin Calcium] RASH    Niacin FACE FLUSHING

## 2025-03-14 NOTE — ANESTHESIA POSTPROCEDURE EVALUATION
Patient: Nathalie Reed    Procedure Summary       Date: 03/13/25 Room / Location: [facility] MAIN OR 02 / [facility] MAIN OR    Anesthesia Start: 1336 Anesthesia Stop: 1607    Procedures:       Second stage reconstruction with removal of bilateral breast tissue expanders, placement of permanent implants and autologous fat grafting to the bilateral breasts, port a cath removal, (Bilateral)      CATHETER REMOVAL Diagnosis:       Absence of breast, bilateral      (Absence of breast, bilateral [Z90.13])    Surgeons: Raciel Jain MD Anesthesiologist: Agustina Anne MD    Anesthesia Type: general ASA Status: 3            Anesthesia Type: general    Vitals Value Taken Time   /63 03/13/25 1737   Temp 98 °F (36.7 °C) 03/13/25 1721   Pulse 70 03/13/25 1737   Resp 16 03/13/25 1737   SpO2 96 % 03/13/25 1737       [facility] AN Post Evaluation:   Patient Evaluated in PACU  Patient Participation: complete - patient participated  Level of Consciousness: awake  Pain Score: 0  Pain Management: adequate  Airway Patency:patent  Dental exam unchanged from preop  Yes    Nausea/Vomiting: none  Cardiovascular Status: acceptable  Respiratory Status: acceptable  Postoperative Hydration acceptable      Agustina Anne MD  3/14/2025 11:14 AM

## 2025-03-14 NOTE — OPERATIVE REPORT
[facility]    PATIENT'S NAME: BRANDO STOVALL   ATTENDING PHYSICIAN: Raciel Jain MD   OPERATING PHYSICIAN: Raciel Jain MD   PATIENT ACCOUNT#:   [id number]    LOCATION:  [facility] 8 [facility] 10  MEDICAL RECORD #:   [id number]       YOB: 1965  ADMISSION DATE:       03/13/2025      OPERATION DATE:  03/13/2025    OPERATIVE REPORT      PREOPERATIVE DIAGNOSIS:  History of bilateral mastectomy and tissue expander reconstruction.  POSTOPERATIVE DIAGNOSIS:  History of bilateral mastectomy and tissue expander reconstruction.  PROCEDURE:    1.   Removal of bilateral breast tissue expanders.  2.   Right breast capsulorrhaphy.  3.   Placement of silicone gel implants, bilateral breasts.  4.   Autologous fat grafting to bilateral reconstructed breasts.  5.   Removal of indwelling vascular access device.    ASSISTANT:  EVA Gupta    ANESTHESIA:  General.    ESTIMATED BLOOD LOSS:  10 mL.    COMPLICATIONS:  None.    INDICATIONS:  Patient is a 59-year-old female who previously underwent bilateral mastectomy and partial submuscular tissue expander reconstruction.  She completed uncomplicated expansion and now presents for exchange of permanent implant and fat grafting.  The patient has completed adjuvant therapies and requests removal of her indwelling vascular access device.    OPERATIVE TECHNIQUE:  Informed consent was obtained from the patient.  The risks, benefits, and alternatives were reviewed with the patient preoperatively.  She expressed understanding and wished to proceed.  The patient was marked in the preoperative holding area in the upright position.  The midline and inframammary folds were marked.  Areas to be fat grafted at the superior medial aspect of bilateral breasts were marked.  Areas of flank lipodystrophy were marked for liposuction.  The patient was then taken to the operating room, properly identified, placed in supine position.  Sequential compression devices  were placed on bilateral lower extremities.  Intravenous antibiotic prophylaxis was administered.  The patient then underwent successful induction of general anesthesia and endotracheal intubation.  The arms were placed abducted on foam-padded armboards and loosely secured with Kerlix.  The chest and abdomen were prepped and draped sterilely.  The proposed liposuction port sites in the lateral abdomen were infiltrated with 1% lidocaine with epinephrine.  Stab incisions were then created and 1 L of tumescent solution was infiltrated into the flanks.  Attention was then turned to the breasts.  Both breast scars were infiltrated with 1% lidocaine with epinephrine as was the left upper chest wall port scar.  The port scar was incised.  The capsule was entered with electrocautery.  The port was delivered via the incision.  The tunnel was secured with a 3-0 Vicryl suture which was secured after the catheter was withdrawn and confirmed to be intact.  The capsule was partially excised and the wound repaired in a layered fashion with 2-0 Vicryl deep sutures, 3-0 Vicryl deep dermal sutures, and 4-0 Monocryl subcuticular suture.  Attention was turned to the breasts.  The right breast scar was excised and sent for permanent pathologic analysis.  A superior subcutaneous flap was then elevated sharply.  A transverse capsulotomy was then created and an intact tissue expander was removed.  The pocket was inspected.  There was no periprosthetic fluid noted.  There were no visible or palpable nodules noted.  Complete incorporation of the acellular dermal matrix was noted.  Sizers were placed and the inferolateral displacement of the implant was noted.  Hence, a lateral capsulorrhaphy was marked externally.  The markings were transposed internally where a lateral capsulorrhaphy of interrupted and running 2-0 Vicryl sutures were performed.  Next, a submuscular pocket was developed with electrocautery.  Attention was turned to the left  breast.  The left breast scar was excised and sent for permanent pathologic analysis.  A superior subcutaneous flap was then elevated sharply.  A transverse capsulotomy was then created and the intact tissue expander was removed.  The pocket was inspected.  There was no periprosthetic fluid noted.  No visible or palpable nodules noted.  The submuscular pocket was developed with electrocautery.  Next, attention was turned to the abdomen.  Using a 4 mm 3-hole Mercedes tip cannula, power-assisted liposuction of the flanks was performed.  Then, 175 mL of lipoaspirate were collected using the Dotflux System.  With sizers in place and the patient in the upright position, the fat was grafted to bilateral breasts; 35 mL was grafted to the right breast and 32 mL was grafted to the left breast.  Next, sizers of different volumes and projections were placed.  Ultimately, it appeared the style  mL implant gave the best size and shape in accordance with the patient's desires.  Both pockets were rinsed with Betadine and antibiotic irrigation until clear.  Hemostasis was checked and noted to be adequate.  The surgical sites were isolated with Ioban and gloves were changed.  The implants were brought on the field and immediately bathed in antibiotic irrigation.  The style SSF Natrelle Inspira soft touch breast implant, 695 mL, reference SSF-695, on the right, serial number [id number], on the left serial number [id number].  The implants were placed in a partial submuscular position taking care to maintain the proper orientation.  The capsules were closed with running 2-0 Vicryl sutures.  The skin was closed with 3-0 Vicryl deep dermal suture and 4-0 Monocryl subcuticular suture.  Dermabond and Steri-Strips were placed on the incisions.  Fluff gauze and a surgical bra were placed on the breasts.  TopiFoam and an abdominal binder placed on the abdomen.  The patient was awakened, extubated, taken to the Recovery in stable condition.   There were no operative complications.  All needle, sponge, and instrument counts were correct at the end of the procedure.    Dictated By Raciel Jain MD  d: 03/13/2025 16:11:45  t: 03/13/2025 22:30:45  [facility] [id number]  [facility]/

## 2025-03-25 ENCOUNTER — OFFICE VISIT (OUTPATIENT)
Facility: CLINIC | Age: 60
End: 2025-03-25
Payer: COMMERCIAL

## 2025-03-25 DIAGNOSIS — Z90.13 ABSENCE OF BREAST, BILATERAL: Primary | ICD-10-CM

## 2025-03-25 PROCEDURE — 99024 POSTOP FOLLOW-UP VISIT: CPT

## 2025-03-25 NOTE — PROGRESS NOTES
Nathalie Reed is a 59 year old female who presents today for a follow-up after removal of bilateral breast tissue expanders, right breast capsulorrhaphy, placement of silicone gel implants (SSF Natrelle Inspira soft touch breast implant, 695 mL), bilateral breasts, autologous fat grafting to bilateral reconstructed breasts, removal of indwelling vascular access device with Dr. Jain on 3/13/2025.    She denies fever and chills. She denies nausea, vomiting, diarrhea or constipation.   Her pain is controlled.      Physical Exam     Breasts: Lateral breast incisions are clean, dry, intact.  There is no evidence of hematoma or seroma bilaterally.  Bilateral mastectomy skin is without erythema.  Resolving ecchymosis consistent with fat grafting present on both breasts.  The left chest Port-A-Cath removal incision is clean, dry, intact.  There is no evidence of a hematoma or seroma.  Bilateral breast implants have acceptable shape and symmetry.    Abdomen: Fat grafting incisions are clean, dry, intact.  There is no evidence of hematoma or seroma.  Soft and nondistended.  Appropriately tender to palpation.    There were no vitals filed for this visit.      Assessment and Plan     Nathalie Reed is doing well s/p removal of bilateral breast tissue expanders, right breast capsulorrhaphy, placement of silicone gel implants (SSF Natrelle Inspira soft touch breast implant, 695 mL), bilateral breasts, autologous fat grafting to bilateral reconstructed breasts, removal of indwelling vascular access device with Dr. Jain on 3/13/2025.    The patient is here today for wound check.  The bilateral breast lesions and left chest Port-A-Cath removal incision were redressed with new Steri-Strips.  The abdominal fat grafting incisions were left open to air today.    Compression activity guidelines were reviewed with the patient today.  The patient will follow-up with Dr. Jain for wound check on 4-29.  She was  encouraged to contact the office with any other questions or concerns.    Questions were answered. Patient understands.     EVA Gupta  3/25/2025  8:53 AM

## 2025-04-29 ENCOUNTER — OFFICE VISIT (OUTPATIENT)
Facility: CLINIC | Age: 60
End: 2025-04-29
Payer: COMMERCIAL

## 2025-04-29 DIAGNOSIS — Z90.13 ABSENCE OF BREAST, BILATERAL: Primary | ICD-10-CM

## 2025-04-29 PROCEDURE — 99024 POSTOP FOLLOW-UP VISIT: CPT | Performed by: SURGERY

## 2025-04-29 NOTE — PROGRESS NOTES
Nathalie Reed is a 60 year old female who presents today in follow-up after undergoing exchange to permanent implants on 3/13.  She reports left shoulder and arm discomfort since starting her anastrozole.  She will be seeing her primary care physician for this.    Physical Examination:  Breasts:Bilateral breast incisions are clean dry and intact.  There is no erythema or seroma noted.  Acceptable shape and symmetry is noted.      Assessment and Plan:  Patient is doing well.  We discussed scar care including massage and moisturizer and silicone products.  The patient may resume regular activity as tolerated.  She will follow-up in 6 months for scar check.  The plan was reviewed with the patient and questions were answered.

## 2025-05-13 ENCOUNTER — LAB ENCOUNTER (OUTPATIENT)
Dept: LAB | Facility: HOSPITAL | Age: 60
End: 2025-05-13
Attending: INTERNAL MEDICINE
Payer: COMMERCIAL

## 2025-05-13 DIAGNOSIS — M05.79 RHEUMATOID ARTHRITIS INVOLVING MULTIPLE SITES WITH POSITIVE RHEUMATOID FACTOR (HCC): ICD-10-CM

## 2025-05-13 LAB
DEPRECATED RDW RBC AUTO: 44.6 FL (ref 35.1–46.3)
ERYTHROCYTE [DISTWIDTH] IN BLOOD BY AUTOMATED COUNT: 14.1 % (ref 11–15)
HCT VFR BLD AUTO: 41 % (ref 35–48)
HGB BLD-MCNC: 13.3 G/DL (ref 12–16)
MCH RBC QN AUTO: 27.9 PG (ref 26–34)
MCHC RBC AUTO-ENTMCNC: 32.4 G/DL (ref 31–37)
MCV RBC AUTO: 86 FL (ref 80–100)
PLATELET # BLD AUTO: 259 10(3)UL (ref 150–450)
RBC # BLD AUTO: 4.77 X10(6)UL (ref 3.8–5.3)
WBC # BLD AUTO: 7.5 X10(3) UL (ref 4–11)

## 2025-05-13 PROCEDURE — 36415 COLL VENOUS BLD VENIPUNCTURE: CPT | Performed by: INTERNAL MEDICINE

## 2025-05-13 PROCEDURE — 80053 COMPREHEN METABOLIC PANEL: CPT | Performed by: INTERNAL MEDICINE

## 2025-05-13 PROCEDURE — 86200 CCP ANTIBODY: CPT | Performed by: INTERNAL MEDICINE

## 2025-05-13 PROCEDURE — 85652 RBC SED RATE AUTOMATED: CPT | Performed by: INTERNAL MEDICINE

## 2025-05-13 PROCEDURE — 86140 C-REACTIVE PROTEIN: CPT | Performed by: INTERNAL MEDICINE

## 2025-05-13 PROCEDURE — 85027 COMPLETE CBC AUTOMATED: CPT

## 2025-05-13 PROCEDURE — 86431 RHEUMATOID FACTOR QUANT: CPT | Performed by: INTERNAL MEDICINE

## 2025-05-14 ENCOUNTER — OFFICE VISIT (OUTPATIENT)
Age: 60
End: 2025-05-14
Payer: COMMERCIAL

## 2025-05-14 VITALS — DIASTOLIC BLOOD PRESSURE: 82 MMHG | BODY MASS INDEX: 34 KG/M2 | SYSTOLIC BLOOD PRESSURE: 118 MMHG | WEIGHT: 200.38 LBS

## 2025-05-14 DIAGNOSIS — Z51.81 MEDICATION MONITORING ENCOUNTER: ICD-10-CM

## 2025-05-14 DIAGNOSIS — M05.79 RHEUMATOID ARTHRITIS INVOLVING MULTIPLE SITES WITH POSITIVE RHEUMATOID FACTOR (HCC): Primary | ICD-10-CM

## 2025-05-14 DIAGNOSIS — M79.605 LEFT LEG PAIN: ICD-10-CM

## 2025-05-14 PROCEDURE — 99214 OFFICE O/P EST MOD 30 MIN: CPT | Performed by: INTERNAL MEDICINE

## 2025-05-14 PROCEDURE — 3074F SYST BP LT 130 MM HG: CPT | Performed by: INTERNAL MEDICINE

## 2025-05-14 PROCEDURE — G2211 COMPLEX E/M VISIT ADD ON: HCPCS | Performed by: INTERNAL MEDICINE

## 2025-05-14 PROCEDURE — 3079F DIAST BP 80-89 MM HG: CPT | Performed by: INTERNAL MEDICINE

## 2025-05-14 RX ORDER — PREDNISONE 10 MG/1
TABLET ORAL
Qty: 20 TABLET | Refills: 0 | Status: SHIPPED | OUTPATIENT
Start: 2025-05-14

## 2025-05-14 NOTE — PATIENT INSTRUCTIONS
You were seen today for rheumatoid arthritis  You are having some more joint pain  Lets try prednisone  Take 30 mg daily for 3 days then 20 mg daily for 3 days then 10 mg daily for 3 days and then stop  If your joints felt better may consider hydroxychloroquine, please let me know and send me a message      Sept 6 9:30 AM, ok per Dr. Marcos Barajas appt Dec/Ruben

## 2025-05-14 NOTE — PROGRESS NOTES
Nathalie Reed is a 60 year old female.    HPI:     Chief Complaint   Patient presents with    Follow - Up     Patient presents for RA     Nathalie presents today 5/13/2025 for Seropositive RA.     Current Medications:  none  Previous medications:  MTX 6 pills weekly and FA daily- started Dec 2019 b/c of hair loss and fatigue  mobic 15 mg daily  Xeljanz 11 mg daily- started Feb 2020- 5/2024  Blood work:  Neg CLAUDIA with RAVI panel, CCP  , CRP 0.66  MRI R knee: mild chondromalacia    Interval History:  This is a  54 yo F with hx of HLD, CAD s/p Bypass, Basal cell carcinoma (last one 2018), Depression presents for f/u joint pain and swelling and +RF.  In March she developed left ankle pain and swelling.  She injured her left ankle about 3 years ago, had some ligament tears but it healed.  Denies any recent trauma or fall.  In September she started to develop right knee and right ankle pain and swelling but she did fall in her shower likely contributing to her symptoms.  She was given Medrol Dosepak which helped her pain and swelling.  She also reports bilateral shoulder stiffness.  Denies any pain or swelling in her hands, wrists, elbows, hips or feet.  She has been seen by orthopedics for bilateral knee pain and x-rays were done which were normal.  MRI was also done of the knee showing mild chondromalacia.    8/7/2021:  Presents for f/u of newly dx Seropositive RA (+RF)  On Xeljanz since Feb 2020  LFTs better, AST now normal and Alt 92. Normal Inflammatory markers  US liver is normal  Is worsening right knee pain.  At times it hurts to go up and down stairs.  She had an MRI done in 2019 showing patellar chondromalacia  Reports minimal left ankle pain  Denies any other joint pain or swelling    3/5/2022:  Presents for f/u of newly dx Seropositive RA (+RF)  On Xeljanz since Feb 2020  Reviewed blood work with patient, continues to have slightly elevated liver enzymes.  They were normal back in March 2020  She  had ultrasound done of her liver back in March 2021 and it was normal  Joints are doing well overall.  At times will have some left ankle pain but otherwise no pain or swelling in her other joints    8/6/2022:  Presents for f/u of newly dx Seropositive RA (+RF)  On Xeljanz   Recent blood work showed mildly elevated ALT of 57, this has been chronic but now much improve  Overall doing very well.  Minimal stiffness in her joints  Continues to have chronic left ankle pain but again very minimal    2/4/2023:  Presents for f/u of newly dx Seropositive RA (+RF)  On Xeljanz   Recent blood work showed mildly elevated ALT of 89. Also ESR elevated 40. Had a sore throat and had to be off for 3 days  Joints are stable, no swelling or pain. No pain in the ankles     8/5/2023:  Presents for f/u of newly dx Seropositive RA (+RF)  On Xeljanz daily   Continues to have elevated LFTs. US liver in 2021 was normal   Joint are stable, some achinness but no swelling   Has pain in right foot, 5th toe, jammed it.  Happened around July 4th weekend but still painful    2/3/2024:  Presents for f/u of newly dx Seropositive RA (+RF)  On Xeljanz daily   Continues to have elevated LFTs. US liver in 2021 was normal and repeat in 2023 was normal  Joint are stable, some achinness but no swelling   No rashes or psoriasis    5/13/2025:  Presents for f/u of Seropositive RA (+RF)  She was diagnosed with breast cancer last year in 2024 s/p chemotherapy, bilateral mastectomy 5/2024  Had recent surgery in March 2025 for removal of bilateral breast expanders and implant placement  She was on anastrozole but was having more joint pain   She was doing well up until recently, she started to have more joint pain once placed on anastrozole   She has pain in the left hand around the thumb, left shoulder pain  Also has pain in both knees, left worse than right  Has b/l thigh pain, left side is worse  Has pain in both ankles  No swelling in joints  No  rashes            HISTORY:  Past Medical History:    ASTHMA    Asthma (HCC)    Atherosclerosis of coronary artery    Cancer (HCC)    Basal Cell Carcinoma    Cancer of breast (HCC)    RIGHT BREAST    Coronary atherosclerosis    double bypass    Depression    HEART DISEASE    2v CABG    High cholesterol    Hx of motion sickness    IBS (irritable bowel syndrome)    Osteoarthritis    Other and unspecified hyperlipidemia    Personal history of antineoplastic chemotherapy    Rheumatoid arthritis (HCC)      Social Hx Reviewed   Family Hx Reviewed     Medications (Active prior to today's visit):  Current Outpatient Medications   Medication Sig Dispense Refill    docusate sodium 100 MG Oral Cap Take 1 capsule (100 mg total) by mouth 2 (two) times daily. 30 capsule 1    HYDROcodone-acetaminophen 5-325 MG Oral Tab Take 1-2 tablets by mouth every 4 (four) hours as needed for Pain. 20 tablet 0    ondansetron (ZOFRAN) 4 mg tablet Take 1 tablet (4 mg total) by mouth every 8 (eight) hours as needed. 12 tablet 0    Naloxone HCl 4 MG/0.1ML Nasal Liquid 4 mg by Nasal route as needed. If patient remains unresponsive, repeat dose in other nostril 2-5 minutes after first dose. 1 kit 0    furosemide 20 MG Oral Tab Take 1 tablet (20 mg total) by mouth daily.      anastrozole 1 MG Oral Tab tab Take 1 tablet (1 mg total) by mouth daily.      Tofacitinib Citrate ER (XELJANZ XR) 11 MG Oral Tablet 24 Hr Take 11 mg by mouth daily. 30 tablet 5    ZOLPIDEM 5 MG Oral Tab TAKE  1 TABLET BY MOUTH   AT BEDTIME  AS  NEEDED  FOR SLEEP 30 tablet 5    Cholecalciferol (VITAMIN D3) 50 MCG (2000 UT) Oral Chew Tab       VENLAFAXINE 150 MG Oral Capsule SR 24 Hr TAKE ONE CAPSULE BY MOUTH ONE TIME DAILY 90 capsule 3    rosuvastatin (CRESTOR) 20 MG Oral Tab Take 1 tablet (20 mg total) by mouth daily. 90 tablet 3     .cmed  Allergies:  Allergies   Allergen Reactions    Lipitor [Atorvastatin Calcium] RASH    Niacin FACE FLUSHING         ROS:   All other ROS are  negative.     PHYSICAL EXAM:   GEN: AAOx3, NAD  HEENT: EOMI, PERRLA, no injection or icterus, oral mucosa moist, no oral lesions. No lymphadenopathy. No facial rash  CVS: RRR, no murmurs rubs or gallops. Equal 2+ distal pulses.   LUNGS: CTAB, no increased work of breathing  ABDOMEN:  soft NT/ND, +BS, no HSM  SKIN: No rashes or skin lesions. No nail findings  MSK:  Cervical spine: FROM  Hands: no synovitis in DIP, PIP and MCP, strong full fists  Wrist: FROM, no pain or swelling or warmth on palpation  Elbow: FROM, no pain or swelling or warmth on palpation  Shoulders: FROM, no pain or swelling or warmth on palpation  Hip: normal log roll, no lateral hip pain, MARCO A test negative b/l  Knees: FROM, no warmth or effusion present. No pain with ROM.   Ankles: FROM, no pain or swelling or warmth on palpation  Feet: R 5th toe TTP  Spine: no lumbar or sacral pain on palpation.  NEURO: Cranial nerves II-XII intact grossly. 5/5 strength throughout in both upper and lower extremities, sensation intact.  PSYCH: normal mood    LABS:     Component      Latest Ref Rng & Units 10/26/2020 3/27/2020   Patient Fasting?       Yes    Glucose      74 - 109 mg/dL 112 (H)    BUN      6.0 - 20.0 mg/dL 21.0 (H)    CREATININE      0.50 - 0.90 mg/dL 0.71    BUN/CREAT Ratio      10.0 - 20.0 30.0 (H)    Sodium      136 - 145 mmol/L 141    Potassium      3.50 - 5.10 mmol/L 4.38    Chloride      98 - 107 mmol/L 103    Carbon Dioxide, Total      22.0 - 29.0 mmol/L 26.8    CALCIUM      8.6 - 10.3 mg/dL 9.5    TOTAL PROTEIN      6.4 - 8.3 g/dL 7.4    Albumin      3.5 - 5.2 g/dL 4.5    Total Bilirubin      0.00 - 1.20 mg/dL 0.32    ALKALINE PHOSPHATASE      41 - 108 U/L 151 (H)    AST (SGOT)      0 - 32 U/L 52 (H) 28   ALT (SGPT)      0 - 33 U/L 82 (H) 44 (H)   GFR CKD-EPI      >=60.00 mL/min/1.73 m² 96.87        Imaging:     US Liver 3/2021:  IMPRESSION:   1. Unremarkable liver ultrasound. Stable exam since the previous study.    MRI R knee  11/2019:  CONCLUSION:   1. Mild chondromalacia patella.  2. Otherwise no acute internal right knee joint derangement.     ASSESSMENT/PLAN:     Seropositive RA (+RF)  - She has been off Xeljanz since 2024 due to diagnosis of cancer  - Having some joint pain involving her left thumb, shoulder and knees.  No active swelling or synovitis on exam.  She was also on letrozole which could have caused some of the joint pain.  She is off of it now  - Blood work is showing elevated ESR  - She will try a prednisone taper, if there is improvement may then consider hydroxychloroquine.  She will let me know  - Risks/Benefits HCQ d/w patient which include: most common s/e are nausea and diarrhea, which improve over time. Less common s/e include rash, changes in skin pigment (such as darkening or dark spots), hair changes, muscle weakness and retinal toxicity. It is recommended that you have an eye exam within the first year of use, then repeat yearly.     Breast cancer  - s/p chemotherapy and b/l mastecomy  - had recent surgery 3/2025 implants placed  - she was on anastrazole but had worsening joint pain     Mildly elevated LFTs  - US liver normal 2021, repeat in 2023 normal   - Liver enzymes were normal back in March 2020.  She started Xeljanz in February 2020.  Slightly elevated liver enzymes are likely from Xeljanz.     R knee pain 2/2 OA  - X-ray in 2019 does show evidence of mild OA  - Recommend to take Tylenol arthritis 650 mg once or twice a day.  Also recommended CBD cream.  She is hesitant to do any injections    Follow up in 4 mos    There is a longitudinal care relationship with me, the care plan reflects the ongoing nature of the continuous relationship of care, and the medical record indicates that there is ongoing treatment of a serious/complex medical condition which I am currently managing.  is Applicable.     Norah Rodríguez MD  5/13/2025  9:26 AM

## 2025-05-15 PROBLEM — M79.605 LEFT LEG PAIN: Status: ACTIVE | Noted: 2025-05-15

## 2025-05-15 PROBLEM — M05.79 RHEUMATOID ARTHRITIS INVOLVING MULTIPLE SITES WITH POSITIVE RHEUMATOID FACTOR (HCC): Status: ACTIVE | Noted: 2025-05-15

## 2025-06-10 ENCOUNTER — APPOINTMENT (OUTPATIENT)
Dept: PHYSICAL THERAPY | Age: 60
End: 2025-06-10
Attending: INTERNAL MEDICINE
Payer: COMMERCIAL

## 2025-06-12 ENCOUNTER — APPOINTMENT (OUTPATIENT)
Dept: PHYSICAL THERAPY | Age: 60
End: 2025-06-12
Attending: INTERNAL MEDICINE
Payer: COMMERCIAL

## 2025-06-12 ENCOUNTER — TELEPHONE (OUTPATIENT)
Dept: PHYSICAL THERAPY | Facility: HOSPITAL | Age: 60
End: 2025-06-12

## 2025-06-16 ENCOUNTER — APPOINTMENT (OUTPATIENT)
Dept: PHYSICAL THERAPY | Age: 60
End: 2025-06-16
Attending: INTERNAL MEDICINE
Payer: COMMERCIAL

## 2025-06-17 ENCOUNTER — APPOINTMENT (OUTPATIENT)
Dept: PHYSICAL THERAPY | Age: 60
End: 2025-06-17
Attending: INTERNAL MEDICINE
Payer: COMMERCIAL

## 2025-06-18 ENCOUNTER — APPOINTMENT (OUTPATIENT)
Dept: PHYSICAL THERAPY | Age: 60
End: 2025-06-18
Attending: INTERNAL MEDICINE
Payer: COMMERCIAL

## 2025-06-19 ENCOUNTER — APPOINTMENT (OUTPATIENT)
Dept: PHYSICAL THERAPY | Age: 60
End: 2025-06-19
Attending: INTERNAL MEDICINE
Payer: COMMERCIAL

## 2025-06-24 ENCOUNTER — APPOINTMENT (OUTPATIENT)
Dept: PHYSICAL THERAPY | Age: 60
End: 2025-06-24
Attending: INTERNAL MEDICINE
Payer: COMMERCIAL

## 2025-06-25 ENCOUNTER — APPOINTMENT (OUTPATIENT)
Dept: PHYSICAL THERAPY | Age: 60
End: 2025-06-25
Attending: INTERNAL MEDICINE
Payer: COMMERCIAL

## 2025-07-02 ENCOUNTER — APPOINTMENT (OUTPATIENT)
Dept: PHYSICAL THERAPY | Age: 60
End: 2025-07-02
Attending: INTERNAL MEDICINE

## 2025-07-07 ENCOUNTER — APPOINTMENT (OUTPATIENT)
Dept: PHYSICAL THERAPY | Age: 60
End: 2025-07-07
Attending: INTERNAL MEDICINE

## (undated) DEVICE — SHEET,DRAPE,40X58,STERILE: Brand: MEDLINE

## (undated) DEVICE — ANTIBACTERIAL UNDYED BRAIDED (POLYGLACTIN 910), SYNTHETIC ABSORBABLE SUTURE: Brand: COATED VICRYL

## (undated) DEVICE — DRAPE,TOWEL,LARGE,INVISISHIELD: Brand: MEDLINE

## (undated) DEVICE — SUT PERMA- 0 18IN FSL NABSRB BLK L30MM 3/8

## (undated) DEVICE — SIZER BRST IMPL 650CC STYL SRF INSPIRA SIL REUSE NATRELLE

## (undated) DEVICE — SUT PROL 2-0 30IN SH NABSRB BLU L26MM 1/2 CIR

## (undated) DEVICE — SUT ETHLN 3-0 18IN FS-1 NABSRB BLK 24MM 3/8 C

## (undated) DEVICE — SYRINGE MED 50ML LL TIP DISP

## (undated) DEVICE — SUT MCRYL 4-0 18IN PS-2 ABSRB UD 19MM 3/8 CIR

## (undated) DEVICE — ZZ-DISC-SUB-405166-SUT COAT VCRL 3-0 27IN SH ABSRB UD 26MM 1/2

## (undated) DEVICE — PROXIMATE SKIN STAPLERS (35 WIDE) CONTAINS 35 STAINLESS STEEL STAPLES (FIXED HEAD): Brand: PROXIMATE

## (undated) DEVICE — CLIP LIG M BLU TI HRT SHP WIRE HORZ

## (undated) DEVICE — ELECTRODE ES L10.2CM BLDE L4IN EXT MPLR OPN

## (undated) DEVICE — SOLUTION SET, MALE LUER LOCK ADAPTER

## (undated) DEVICE — 3M™ TEGADERM™ TRANSPARENT FILM DRESSING FRAME STYLE, 1624W, 2-3/8 IN X 2-3/4 IN (6 CM X 7 CM), 100/CT 4CT/CASE: Brand: 3M™ TEGADERM™

## (undated) DEVICE — GLOVE SUR 6 SENSICARE PI PIP CRM PWD F

## (undated) DEVICE — SYRINGE IRRIG 60ML TOOM TIP BOLD 2 GRAD RIG

## (undated) DEVICE — APPLICATOR PREP 26ML CHG 2% ISO ALC 70%

## (undated) DEVICE — MAJOR GENERAL: Brand: MEDLINE INDUSTRIES, INC.

## (undated) DEVICE — MARKER SKIN PREP RESIST STRL

## (undated) DEVICE — 4-WAY HIGH FLOW STOPCOCK W/ROTATING LUER: Brand: ICU MEDICAL

## (undated) DEVICE — GAMMEX® PI HYBRID SIZE 7.5, STERILE POWDER-FREE SURGICAL GLOVE, POLYISOPRENE AND NEOPRENE BLEND: Brand: GAMMEX

## (undated) DEVICE — YANKAUER,FLEXIBLE HANDLE,REGLR CAPACITY: Brand: MEDLINE INDUSTRIES, INC.

## (undated) DEVICE — EVACUATOR SUR 100CC SIL BLB WND

## (undated) DEVICE — Device

## (undated) DEVICE — 1010 S-DRAPE TOWEL DRAPE 10/BX: Brand: STERI-DRAPE™

## (undated) DEVICE — BANDAGE,GAUZE,BULKEE II,4.5"X4.1YD,STRL: Brand: MEDLINE

## (undated) DEVICE — 3M™ IOBAN™ 2 ANTIMICROBIAL INCISE DRAPE 6650EZ: Brand: IOBAN™ 2

## (undated) DEVICE — LAPAROTOMY SPONGE - RF AND X-RAY DETECTABLE PRE-WASHED: Brand: SITUATE

## (undated) DEVICE — INTENDED USE FOR SURGICAL MARKING ON INTACT SKIN, ALSO PROVIDES A PERMANENT METHOD OF IDENTIFYING OBJECTS IN THE OPERATING ROOM: Brand: WRITESITE® PLUS MINI PREP RESISTANT MARKER

## (undated) DEVICE — ADHESIVE SKIN TOP FOR WND CLSR DERMBND ADV

## (undated) DEVICE — SLIM BODY SKIN STAPLER: Brand: APPOSE ULC

## (undated) DEVICE — GLOVE SUR 7.5 SENSICARE PI PIP CRM PWD F

## (undated) DEVICE — GOWN,SIRUS,FABRNF,RAGLAN,L,ST,30/CS: Brand: MEDLINE

## (undated) DEVICE — UNDERPAD INCONT 23X36IN STD BLU BACKSHEET

## (undated) DEVICE — SOLUTION IRRIG 1000ML 0.9% NACL USP BTL

## (undated) DEVICE — GOWN,SIRUS,FABRIC-REINFORCED,X-LARGE: Brand: MEDLINE

## (undated) DEVICE — SUCTION CANISTER, 3000CC,SAFELINER: Brand: DEROYAL

## (undated) DEVICE — SOLUTION PREP 10% POVIDONE IOD 32OZ BTL

## (undated) DEVICE — ASPIRATION TUBING SET, DISPOSABLE: Brand: MICROAIRE®

## (undated) DEVICE — SYSTEM ADIPOSE REVOLVE PROC AUTOLGS ADV INCL CANSTR

## (undated) DEVICE — TRAY CATH 16FR F INCL BARDX IC COMPLT CARE

## (undated) DEVICE — BREAST-HERNIA-PORT CDS-LF: Brand: MEDLINE INDUSTRIES, INC.

## (undated) DEVICE — BINDER ABD SM M W12IN CIRC 30-45IN 4 PNL E

## (undated) DEVICE — LIGACLIP MCA MULTIPLE CLIP APPLIERS, 20 MEDIUM CLIPS: Brand: LIGACLIP

## (undated) DEVICE — SUT PERMA- 0 30IN NABSRB BLK TIE SILK

## (undated) DEVICE — 3M™ STERI-STRIP™ REINFORCED ADHESIVE SKIN CLOSURES, R1548, 1 IN X 5 IN (25 MM X 125 MM), 4 STRIPS/ENVELOPE: Brand: 3M™ STERI-STRIP™

## (undated) DEVICE — 40580 - THE PINK PAD - ADVANCED TRENDELENBURG POSITIONING KIT: Brand: 40580 - THE PINK PAD - ADVANCED TRENDELENBURG POSITIONING KIT

## (undated) DEVICE — VIOLET BRAIDED (POLYGLACTIN 910), SYNTHETIC ABSORBABLE SUTURE: Brand: COATED VICRYL

## (undated) DEVICE — 3M™ BAIR HUGGER® UNDERBODY BLANKET, FULL ACCESS, 10 PER CASE 63500: Brand: BAIR HUGGER™

## (undated) DEVICE — GLOVE SUR 6.5 SENSICARE PI PIP CRM PWD F

## (undated) DEVICE — GAUZE,SPONGE,FLUFF,6"X6.75",STRL,5/TRAY: Brand: MEDLINE

## (undated) DEVICE — 3M™ IOBAN™ 2 ANTIMICROBIAL INCISE DRAPE 6651EZ: Brand: IOBAN™ 2

## (undated) DEVICE — ELECTRODE ES 2.75IN PTFE BLDE MOD E-Z CLN

## (undated) DEVICE — PLASTIC BREAST CDS-LF: Brand: MEDLINE INDUSTRIES, INC.

## (undated) DEVICE — PROVE COVER: Brand: UNBRANDED

## (undated) DEVICE — SOLUTION PREP 4OZ 10% POVIDONE IOD SCR TOP

## (undated) DEVICE — APPLICATOR PREP 10.5ML ORNG CHG 2% ISO ALC

## (undated) DEVICE — Device: Brand: MICROAIRE®

## (undated) DEVICE — SUT ETHLN 3-0 18IN PS-1 NABSRB BLK 24MM 3/8 C

## (undated) DEVICE — STANDARD HYPODERMIC NEEDLE,POLYPROPYLENE HUB: Brand: MONOJECT

## (undated) DEVICE — SLEEVE COMPR MD KNEE LEN SGL USE KENDALL SCD

## (undated) DEVICE — SUT COAT VCRL 2-0 27IN SH ABSRB UD 26MM 1/2

## (undated) DEVICE — DRAIN SUR 15FR L3/16IN DIA4.7MM SIL RND

## (undated) DEVICE — SUT PDS II 2-0 27IN SH ABSRB VLT L26MM 1/2

## (undated) DEVICE — AEGIS 1" DISK 4MM HOLE, PEEL OPEN: Brand: MEDLINE

## (undated) DEVICE — 3M™ IOBAN™ 2 ANTIMICROBIAL INCISE DRAPE 6648EZ: Brand: IOBAN™ 2

## (undated) DEVICE — BRA MASTECTOMY XL PNK ULT SFT PERF FAB STYL

## (undated) NOTE — LETTER
02/10/21        [address] [facility]  [address] [facility] [id number]      Dear Antoni Gutierrez,    [address] [facility] records indicate that you have outstanding lab work and or testing that was ordered for you and has not yet been completed:  Orders Placed This Encou